# Patient Record
Sex: FEMALE | Race: BLACK OR AFRICAN AMERICAN | Employment: UNEMPLOYED | URBAN - METROPOLITAN AREA
[De-identification: names, ages, dates, MRNs, and addresses within clinical notes are randomized per-mention and may not be internally consistent; named-entity substitution may affect disease eponyms.]

---

## 2019-03-13 ENCOUNTER — TRANSFERRED RECORDS (OUTPATIENT)
Dept: MULTI SPECIALTY CLINIC | Facility: CLINIC | Age: 19
End: 2019-03-13

## 2019-03-13 LAB
ABO + RH BLD: NORMAL
ABO + RH BLD: NORMAL
BLD GP AB SCN SERPL QL: NEGATIVE
C TRACH DNA SPEC QL PROBE+SIG AMP: NOT DETECTED
HBV SURFACE AG SERPL QL IA: NONREACTIVE
HCT VFR BLD AUTO: 38.6 %
HCT VFR BLD AUTO: 38.6 %
HEMOGLOBIN: 13.2 G/DL (ref 11.7–15.7)
HEMOGLOBIN: 13.2 G/DL (ref 11.7–15.7)
HIV 1+2 AB+HIV1 P24 AG SERPL QL IA: NONREACTIVE
N GONORRHOEA DNA SPEC QL PROBE+SIG AMP: NOT DETECTED
PLATELET # BLD AUTO: 240 10^9/L
RUBELLA ABY IGG: NORMAL
RUBELLA ANTIBODY IGG QUANTITATIVE: 13 IU/ML
SPECIMEN DESCRIP: NORMAL
SPECIMEN DESCRIPTION: NORMAL
TREPONEMA ANTIBODIES: NONREACTIVE

## 2019-05-09 ENCOUNTER — PRENATAL OFFICE VISIT (OUTPATIENT)
Dept: MIDWIFE SERVICES | Facility: CLINIC | Age: 19
End: 2019-05-09
Payer: COMMERCIAL

## 2019-05-09 ENCOUNTER — PRENATAL OFFICE VISIT (OUTPATIENT)
Dept: NURSING | Facility: CLINIC | Age: 19
End: 2019-05-09
Payer: COMMERCIAL

## 2019-05-09 VITALS
SYSTOLIC BLOOD PRESSURE: 113 MMHG | DIASTOLIC BLOOD PRESSURE: 75 MMHG | BODY MASS INDEX: 24.4 KG/M2 | TEMPERATURE: 98 F | HEART RATE: 90 BPM | WEIGHT: 160.5 LBS

## 2019-05-09 VITALS
HEIGHT: 68 IN | SYSTOLIC BLOOD PRESSURE: 113 MMHG | DIASTOLIC BLOOD PRESSURE: 75 MMHG | WEIGHT: 160.5 LBS | TEMPERATURE: 98 F | BODY MASS INDEX: 24.32 KG/M2 | HEART RATE: 90 BPM

## 2019-05-09 DIAGNOSIS — Z34.00 SUPERVISION OF NORMAL FIRST PREGNANCY: Primary | ICD-10-CM

## 2019-05-09 DIAGNOSIS — Z34.02 ENCOUNTER FOR SUPERVISION OF NORMAL FIRST PREGNANCY, SECOND TRIMESTER: Primary | ICD-10-CM

## 2019-05-09 PROBLEM — Z34.01 PREGNANCY, FIRST, FIRST TRIMESTER: Status: ACTIVE | Noted: 2019-03-13

## 2019-05-09 PROBLEM — F12.90 MARIJUANA USE: Status: ACTIVE | Noted: 2017-10-26

## 2019-05-09 PROBLEM — E55.9 VITAMIN D DEFICIENCY: Status: ACTIVE | Noted: 2019-03-15

## 2019-05-09 PROBLEM — Z78.9 NONIMMUNE TO HEPATITIS B VIRUS: Status: ACTIVE | Noted: 2019-03-15

## 2019-05-09 PROCEDURE — 36415 COLL VENOUS BLD VENIPUNCTURE: CPT | Performed by: ADVANCED PRACTICE MIDWIFE

## 2019-05-09 PROCEDURE — 99207 ZZC FIRST OB VISIT: CPT | Performed by: ADVANCED PRACTICE MIDWIFE

## 2019-05-09 PROCEDURE — 99207 ZZC NO CHARGE NURSE ONLY: CPT

## 2019-05-09 PROCEDURE — 99000 SPECIMEN HANDLING OFFICE-LAB: CPT | Performed by: ADVANCED PRACTICE MIDWIFE

## 2019-05-09 PROCEDURE — 81511 FTL CGEN ABNOR FOUR ANAL: CPT | Mod: 90 | Performed by: ADVANCED PRACTICE MIDWIFE

## 2019-05-09 RX ORDER — ECHINACEA PURPUREA EXTRACT 125 MG
2 TABLET ORAL
COMMUNITY
Start: 2019-04-07 | End: 2019-07-08

## 2019-05-09 RX ORDER — SODIUM CHLORIDE 0.65 %
AEROSOL, SPRAY (ML) NASAL
COMMUNITY
Start: 2019-04-07 | End: 2019-08-15

## 2019-05-09 RX ORDER — PNV NO.118/IRON FUMARATE/FA 29 MG-1 MG
1 TABLET,CHEWABLE ORAL
COMMUNITY
Start: 2019-04-04 | End: 2020-01-14

## 2019-05-09 RX ORDER — ALBUTEROL SULFATE 90 UG/1
1-2 AEROSOL, METERED RESPIRATORY (INHALATION)
COMMUNITY
Start: 2019-02-28

## 2019-05-09 SDOH — HEALTH STABILITY: MENTAL HEALTH
STRESS IS WHEN SOMEONE FEELS TENSE, NERVOUS, ANXIOUS, OR CAN'T SLEEP AT NIGHT BECAUSE THEIR MIND IS TROUBLED. HOW STRESSED ARE YOU?: NOT AT ALL

## 2019-05-09 SDOH — HEALTH STABILITY: PHYSICAL HEALTH: ON AVERAGE, HOW MANY MINUTES DO YOU ENGAGE IN EXERCISE AT THIS LEVEL?: 30 MIN

## 2019-05-09 SDOH — HEALTH STABILITY: PHYSICAL HEALTH: ON AVERAGE, HOW MANY DAYS PER WEEK DO YOU ENGAGE IN MODERATE TO STRENUOUS EXERCISE (LIKE A BRISK WALK)?: 5 DAYS

## 2019-05-09 SDOH — ECONOMIC STABILITY: FOOD INSECURITY: WITHIN THE PAST 12 MONTHS, THE FOOD YOU BOUGHT JUST DIDN'T LAST AND YOU DIDN'T HAVE MONEY TO GET MORE.: NEVER TRUE

## 2019-05-09 SDOH — ECONOMIC STABILITY: TRANSPORTATION INSECURITY
IN THE PAST 12 MONTHS, HAS THE LACK OF TRANSPORTATION KEPT YOU FROM MEDICAL APPOINTMENTS OR FROM GETTING MEDICATIONS?: NO

## 2019-05-09 SDOH — SOCIAL STABILITY: SOCIAL INSECURITY
WITHIN THE LAST YEAR, HAVE TO BEEN RAPED OR FORCED TO HAVE ANY KIND OF SEXUAL ACTIVITY BY YOUR PARTNER OR EX-PARTNER?: NO

## 2019-05-09 SDOH — ECONOMIC STABILITY: TRANSPORTATION INSECURITY
IN THE PAST 12 MONTHS, HAS LACK OF TRANSPORTATION KEPT YOU FROM MEETINGS, WORK, OR FROM GETTING THINGS NEEDED FOR DAILY LIVING?: NO

## 2019-05-09 SDOH — SOCIAL STABILITY: SOCIAL INSECURITY: WITHIN THE LAST YEAR, HAVE YOU BEEN AFRAID OF YOUR PARTNER OR EX-PARTNER?: NO

## 2019-05-09 SDOH — SOCIAL STABILITY: SOCIAL INSECURITY
WITHIN THE LAST YEAR, HAVE YOU BEEN KICKED, HIT, SLAPPED, OR OTHERWISE PHYSICALLY HURT BY YOUR PARTNER OR EX-PARTNER?: NO

## 2019-05-09 SDOH — ECONOMIC STABILITY: FOOD INSECURITY: WITHIN THE PAST 12 MONTHS, YOU WORRIED THAT YOUR FOOD WOULD RUN OUT BEFORE YOU GOT MONEY TO BUY MORE.: NEVER TRUE

## 2019-05-09 SDOH — ECONOMIC STABILITY: INCOME INSECURITY: HOW HARD IS IT FOR YOU TO PAY FOR THE VERY BASICS LIKE FOOD, HOUSING, MEDICAL CARE, AND HEATING?: NOT HARD AT ALL

## 2019-05-09 SDOH — SOCIAL STABILITY: SOCIAL INSECURITY: WITHIN THE LAST YEAR, HAVE YOU BEEN HUMILIATED OR EMOTIONALLY ABUSED IN OTHER WAYS BY YOUR PARTNER OR EX-PARTNER?: NO

## 2019-05-09 ASSESSMENT — MIFFLIN-ST. JEOR: SCORE: 1551.52

## 2019-05-09 NOTE — PROGRESS NOTES
16w3d   Deann Lund is a 18 year old who presents to the clinic for an new ob visit.  She not a previous CNM patient. This is her first pregnancy. She had new OB labs at [place]. Has history of asthma, usually only needs albuterol inhaler. Has had some nausea with this pregnancy, but has been able to keep food down and has not had weight loss. Pregnancy dating by early U/S on 3/19/19; was 9w1d.   Estimated Date of Delivery: Oct 21, 2019 is calculated from No LMP recorded. Patient is pregnant.     She has not had bleeding since her LMP.   She has had mild nausea. Weigh loss has not occurred.   This was not a planned pregnancy.   FOB is involved,  [place]   OTHER CONCERNS: none    INFECTION HISTORY  HIV: no  Hepatitis B: no  Hepatitis C: no  Syphilis:  no  Tuberculosis: no   PPD- no  Herpes self: no  Herpes partner:  no  Chlamydia:  Yes-in the past  Gonorrhea:  no  HPV: no  BV:  Yes-a while ago  Trichomonis:  Yes-in the past  Chicken Pox:  Yes-vaccinated  ====================================================  GENETIC SCREENING  Genetic screening reviewed. High Risk? no  ====================================================  PERSONAL/SOCIAL HISTORY  Lives with her sister.  Employment: Part time.  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  HX OF ABUSE: no  =====================================================   REVIEW OF SYSTEMS  CONSTITUTIONAL: NEGATIVE for fever, chills  EYES: NEGATIVE for vision changes   RESP:NEGATIVE for significant cough or SOB, POSITIVE for SOB/dyspnea and NEGATIVE for cough-productive  CV: NEGATIVE for chest pain, palpitations   GI: POSITIVE for nausea and NEGATIVE for constipation, diarrhea and vomiting  : NEGATIVE for frequency, dysuria, or hematuria  MUSCULOSKELETAL: NEGATIVE for significant arthralgias or myalgia  NEURO: POSITIVE for dizziness/lightheadedness and NEGATIVE for behavior changes, paresthesias, weakness and visual change  PSYCHIATRIC:  NEGATIVE for changes in mood or affect  ====================================================    PHYSICAL EXAM:  There were no vitals taken for this visit.  BMI- There is no height or weight on file to calculate BMI.,     RECOMMENDED WEIGHT GAIN: 25-35 lbs.  PHQ9- Today's Depression Rating was No Value exists for the : HP#PHQ9  GENERAL:  Pleasant pregnant female, alert, well groomed.   SKIN:  Warm and dry, without lesions or rashes  HEAD: Symmetrical features.  EYES:  PERRLA,   NECK:  Thyroid without enlargement and nodules.  Lymph nodes not palpable.   LUNGS:  Clear to auscultation.  HEART:  RRR without murmur.  ABDOMEN: Soft without masses , tenderness or organomegaly.  No CVA tenderness. No scars noted.     MUSCULOSKELETAL:  Full range of motion  EXTREMITIES:  No edema. No significant varicosities.     =========================================  ASSESSMENT:  16w3d,   (Z34.02) Encounter for supervision of normal first pregnancy, second trimester  (primary encounter diagnosis)  Plan: US OB > 14 Weeks    PREGNANCY AT RISK? no  ==========================================  PLAN:  Instructed on use of triage nurse line and contacting the on call CNM after hours for an urgent need such as fever, vagina bleeding, bladder or vaginal infection, rupture of membranes,  or term labor.    Discussed the indications, uses for and false positives for quad screen, nuchal translucency and fetal survey ultrasound at 18-20 weeks gestation. Had Quad screen drawn today.  Instructed on best evidence for: weight gain for her BMI for pregnancy; healthy diet and foods to avoid; exercise and activity during pregnancy;avoiding exposure to toxoplasmosis; and maintenance of a generally healthy lifestyle.   Discussed the harms, benefits, side effects and alternative therapies for current prescribed and OTC medications.  Discussed marijuana cessation, and urine drug screening in labor.    Farzad Brown CNM

## 2019-05-09 NOTE — NURSING NOTE
"Chief Complaint   Patient presents with     Prenatal Care     16+3       Initial There were no vitals taken for this visit. Estimated body mass index is 24.4 kg/m  as calculated from the following:    Height as of an earlier encounter on 19: 1.727 m (5' 8\").    Weight as of an earlier encounter on 19: 72.8 kg (160 lb 8 oz).  BP completed using cuff size: regular    Questioned patient about current smoking habits.  Pt.           The following HM Due: NONE      The following patient reported/Care Every where data was sent to:  P ABSTRACT QUALITY INITIATIVES [[id number]]        patient has appointment for today  Kayleigh Diane                "

## 2019-05-09 NOTE — PROGRESS NOTES
"Important Information for Provider: ***    Prenatal OB Questionnaire  {DELETE after use: reminder .peqobprenatal if pulling in the flowsheet for prenatal questionnaire responses}    Allergies as of 5/9/2019:    Allergies as of 05/09/2019 - Reviewed 05/09/2019   Allergen Reaction Noted     Pineapple Swelling 07/20/2015       Current medications are:  Current Outpatient Medications   Medication Sig Dispense Refill     albuterol (PROVENTIL HFA) 108 (90 Base) MCG/ACT inhaler Inhale 1-2 puffs into the lungs       Prenatal Vit-Fe Fumarate-FA (PRENATAL 19) CHEW Take 1 tablet by mouth       sodium chloride (SALINE MIST) 0.65 % nasal spray Spray 2 sprays in nostril       DEEP SEA NASAL SPRAY 0.65 % nasal spray            Early ultrasound screening tool:    Does patient have irregular periods?  { Yes/No (No Default) :743227::\"No\"}  Did patient use hormonal birth control in the three months prior to positive urine pregnancy test? { Yes/No (No Default) :503999::\"No\"}  Is the patient breastfeeding?  { Yes/No (No Default) :887470::\"No\"}  Is the patient 10 weeks or greater at time of education visit?  { Yes/No (No Default) :169083::\"No\"}    {If yes to any of the questions listed above, order an OB Ultrasound for dating.  Patient must be at least 6 weeks to perform dating ultrasound.}    "

## 2019-05-09 NOTE — PROGRESS NOTES
Patient presents for new ob transfer from [facility]. Labs and ultrasound were done there. Quad screening done today . Has NOB with CNM today  ELP drawn 3/13/19 normal          Patient supplied answers from flow sheet for:  Prenatal OB Questionnaire.  Past Medical History  Diabetes?: No  Hypertension : No  Heart disease, mitral valve prolapse or rheumatic fever?: No  An autoimmune disease such as lupus or rheumatoid arthritis?: No  Kidney disease or urinary tract infection?: No  Epilepsy, seizures or spells?: No  Migraine headaches?: No  A stroke or loss of function or sensation?: No  Any other neurological problems?: No  Have you ever been treated for depression?: No  Are you having problems with crying spells or loss of self-esteem?: No  Have you ever required psychiatric care?: No  Have you ever had hepatitis, liver disease or jaundice?: No  Have you been treated for blood clots in your veins, deep vein thromosis, inflammation in the veins, thrombosis, phlebitis, pulmonary embolism or varicosities?: No  Have you had excessive bleeding after surgery or dental work?: No  Do you bleed more than other women after a cut or scratch?: No  Do you have a history of anemia?: No  Have you ever had thyroid problems or taken thyroid medication?: No   Do you have any endocrine problems?: No  Have you ever been in a major accident or suffered serious trauma?: No  Within the last year, has anyone hit, slapped, kicked or otherwise hurt you?: No  In the last year, has anyone forced you to have sex when you didn't want to?: No    Past Medical History 2   Have you ever received a blood transfusion?: No  Would you refuse a blood transfusion if a doctor judged it to be medically necessary?: No   If you answered Yes, would you rather die than receive a blood transfusion?: No  If you answered Yes, is this for Sabianist reasons?: No  Does anyone in your home smoke?: No  Do you use tobacco products?: No  Do you drink beer, wine or hard  liquor?: No  Do you use any of the following: marijuana, speed, cocaine, heroin, hallucinogens or other drugs?: No   Is your blood type Rh negative?: No  Have you ever had abnormal antibodies in your blood?: No  Have you ever had asthma?: (!) Yes  Have you ever had tuberculosis?: No  Do you have any allergies to drugs or over-the-counter medications?: No  Allergies: Dust Mites, Aspartame, Ethanol, Venlafaxine, Hydrochloride, Sertraline: (!) Yes  Have you had any breast problems?: No  Have you ever ?: No  Have you had any gynecological surgical procedures such as cervical conization, a LEEP procedure, laser treatment, cryosurgery of the cervix or a dilation and curettage, etc?: No  Have you ever had any other surgical procedures?: No  Have you been hospitalized for a nonsurgical reason excluding normal delivery?: No  Have you ever had any anesthetic complications?: No  Have you ever had an abnormal pap smear?: No    Past Medical History (Continued)  Do you have a history of abnormalities of the uterus?: No  Did your mother take MARTITA or any other hormones when she was pregnant with you?: No  Did it take you more than a year to become pregnant?: No  Have you ever been evaluated or treated for infertility?: No  Is there a history of medical problems in your family, which you feel may be important to this pregnancy?: No  Do you have any other problems we have not asked about which you feel may be important to this pregnancy?: No    Symptoms since last menstrual period  Do you have any of the following symptoms: abdominal pain, blood in stools or urine, chest pain, shortness of breath, coughing or vomiting up blood, your heart racing or skipping beats, nausea and vomiting, pain on urination or vaginal discharge or bleed: (!) Yes  Will the patient be 35 years old or older at the time of delivery?: No    Has the patient, baby's father or anyone in either family had:  Thalassemia (Italian, Greek, Mediterranean or   background only) and an MCV result less than 80?: No  Neural tube defect such as meningomyelocele, spina bifida or anencephaly?: No  Congenital heart defect?: No  Down's Syndrome?: No  Raj-Sachs disease (Presybeterian, Cajun, Maltese-Hertford)?: No  Sickle cell disease or trait ()?: No  Hemophilia or other inherited problems of blood?: No  Muscular dystrophy?: No  Cystic fibrosis?: No  Vinton's chorea?: No  Mental retardation/autism?: No  If yes, was the person tested for fragile X?: No  Any other inherited genetic or chromosomal disorder?: No  Maternal metabolic disorder (e.g Insulin-dependent diabetes, PKU)?: No  A child with birth defects not listed above?: No  Recurrent pregnancy loss or stillbirth?: No   Has the patient had any medications/street drugs/alcohol since her last menstrual period?: No  Does the patient or baby's father have any other genetic risks?: No    Infection History   Do you object to being tested for Hepatitis B?: No  Do you object to being tested for HIV?: No   Do you feel that you are at high risk for coming in contact with the AIDS virus?: No  Have you ever been treated for tuberculosis?: No  Have you ever had a positive skin test for tuberculosis?: No  Do you live with someone who has tuberculosis?: No  Have you ever been exposed to tuberculosis?: No  Do you have genital herpes?: No  Does your partner have genital herpes?: No  Have you had a viral illness since your last period?: No  Have you ever had gonorrhea, chlamydia, syphilis, venereal warts, trichomoniasis, pelvic inflammatory disease or any other sexually transmitted disease?: (!) Yes  Do you know if you are a genital group B streptococcus carrier?: No  Have you had chicken pox/varicella?: No   Have you been vaccinated against chicken Pox?: No  Have you had any other infectious diseases?: No

## 2019-05-13 LAB
# FETUSES US: NORMAL
# FETUSES: NORMAL
AFP ADJ MOM AMN: 0.99
AFP SERPL-MCNC: 36 NG/ML
AGE - REPORTED: 20 YR
CURRENT SMOKER: NO
CURRENT SMOKER: NO
DIABETES STATUS PATIENT: NO
FAMILY MEMBER DISEASES HX: NO
FAMILY MEMBER DISEASES HX: NO
GA METHOD: NORMAL
GA METHOD: NORMAL
GA: NORMAL WK
HCG MOM SERPL: 1.89
HCG SERPL-ACNC: NORMAL IU/L
HX OF HEREDITARY DISORDERS: NO
IDDM PATIENT QL: NO
INHIBIN A MOM SERPL: 0.98
INHIBIN A SERPL-MCNC: 145 PG/ML
INTEGRATED SCN PATIENT-IMP: NORMAL
IVF PREGNANCY: NO
LMP START DATE: NO
MONOCHORIONIC TWINS: NO
PATHOLOGY STUDY: NORMAL
PREV FETUS DEFECT: NO
SERVICE CMNT-IMP: NO
SPECIMEN DRAWN SERPL: NORMAL
U ESTRIOL MOM SERPL: 0.89
U ESTRIOL SERPL-MCNC: 0.87 NG/ML
VALPROIC/CARBAMAZEPINE STATUS: NO
WEIGHT UNITS: NORMAL

## 2019-06-05 ENCOUNTER — HOSPITAL ENCOUNTER (OUTPATIENT)
Dept: ULTRASOUND IMAGING | Facility: CLINIC | Age: 19
Discharge: HOME | End: 2019-06-05
Attending: ADVANCED PRACTICE MIDWIFE | Admitting: ADVANCED PRACTICE MIDWIFE
Payer: COMMERCIAL

## 2019-06-05 DIAGNOSIS — Z34.02 ENCOUNTER FOR SUPERVISION OF NORMAL FIRST PREGNANCY, SECOND TRIMESTER: ICD-10-CM

## 2019-06-05 PROCEDURE — 76805 OB US >/= 14 WKS SNGL FETUS: CPT

## 2019-06-07 ENCOUNTER — PRENATAL OFFICE VISIT (OUTPATIENT)
Dept: MIDWIFE SERVICES | Facility: CLINIC | Age: 19
End: 2019-06-07
Payer: COMMERCIAL

## 2019-06-07 VITALS
SYSTOLIC BLOOD PRESSURE: 123 MMHG | HEIGHT: 68 IN | DIASTOLIC BLOOD PRESSURE: 73 MMHG | BODY MASS INDEX: 25.76 KG/M2 | WEIGHT: 170 LBS | HEART RATE: 94 BPM | OXYGEN SATURATION: 100 %

## 2019-06-07 DIAGNOSIS — Z34.02 ENCOUNTER FOR SUPERVISION OF NORMAL FIRST PREGNANCY, SECOND TRIMESTER: Primary | ICD-10-CM

## 2019-06-07 PROCEDURE — 99207 ZZC PRENATAL VISIT: CPT | Performed by: ADVANCED PRACTICE MIDWIFE

## 2019-06-07 ASSESSMENT — PATIENT HEALTH QUESTIONNAIRE - PHQ9: SUM OF ALL RESPONSES TO PHQ QUESTIONS 1-9: 1

## 2019-06-07 ASSESSMENT — MIFFLIN-ST. JEOR: SCORE: 1594.61

## 2019-06-07 NOTE — PROGRESS NOTES
Reviewed US and all anatomy was WNL.  Works at [employer] so busy shifts.  On her feet daily.  Discussed wt gain which is alittle advanced at 16#.  Cut down on caloric load with some low jhoan food choices like veggies.  Watch juice intake.  ASSESSMENT: 20w4d    PLAN: RTC in 4 wks for GCT testing.   PJ

## 2019-07-08 ENCOUNTER — PRENATAL OFFICE VISIT (OUTPATIENT)
Dept: MIDWIFE SERVICES | Facility: CLINIC | Age: 19
End: 2019-07-08
Payer: COMMERCIAL

## 2019-07-08 VITALS
BODY MASS INDEX: 26.66 KG/M2 | OXYGEN SATURATION: 100 % | SYSTOLIC BLOOD PRESSURE: 120 MMHG | HEART RATE: 90 BPM | HEIGHT: 68 IN | WEIGHT: 175.9 LBS | DIASTOLIC BLOOD PRESSURE: 73 MMHG

## 2019-07-08 DIAGNOSIS — Z34.02 ENCOUNTER FOR SUPERVISION OF NORMAL FIRST PREGNANCY, SECOND TRIMESTER: ICD-10-CM

## 2019-07-08 LAB
GLUCOSE 1H P 50 G GLC PO SERPL-MCNC: 73 MG/DL (ref 60–129)
HGB BLD-MCNC: 10.5 G/DL (ref 11.7–15.7)

## 2019-07-08 PROCEDURE — 36415 COLL VENOUS BLD VENIPUNCTURE: CPT | Performed by: ADVANCED PRACTICE MIDWIFE

## 2019-07-08 PROCEDURE — 99207 ZZC PRENATAL VISIT: CPT | Performed by: ADVANCED PRACTICE MIDWIFE

## 2019-07-08 PROCEDURE — 00000218 ZZHCL STATISTIC OBHBG - HEMOGLOBIN: Performed by: ADVANCED PRACTICE MIDWIFE

## 2019-07-08 PROCEDURE — 82950 GLUCOSE TEST: CPT | Performed by: ADVANCED PRACTICE MIDWIFE

## 2019-07-08 ASSESSMENT — ANXIETY QUESTIONNAIRES
2. NOT BEING ABLE TO STOP OR CONTROL WORRYING: NOT AT ALL
3. WORRYING TOO MUCH ABOUT DIFFERENT THINGS: NOT AT ALL
6. BECOMING EASILY ANNOYED OR IRRITABLE: NOT AT ALL
1. FEELING NERVOUS, ANXIOUS, OR ON EDGE: NOT AT ALL
GAD7 TOTAL SCORE: 0
5. BEING SO RESTLESS THAT IT IS HARD TO SIT STILL: NOT AT ALL
7. FEELING AFRAID AS IF SOMETHING AWFUL MIGHT HAPPEN: NOT AT ALL
IF YOU CHECKED OFF ANY PROBLEMS ON THIS QUESTIONNAIRE, HOW DIFFICULT HAVE THESE PROBLEMS MADE IT FOR YOU TO DO YOUR WORK, TAKE CARE OF THINGS AT HOME, OR GET ALONG WITH OTHER PEOPLE: NOT DIFFICULT AT ALL

## 2019-07-08 ASSESSMENT — MIFFLIN-ST. JEOR: SCORE: 1621.38

## 2019-07-08 ASSESSMENT — PATIENT HEALTH QUESTIONNAIRE - PHQ9
5. POOR APPETITE OR OVEREATING: NOT AT ALL
SUM OF ALL RESPONSES TO PHQ QUESTIONS 1-9: 0

## 2019-07-08 NOTE — PROGRESS NOTES
Doing well.  Having a girl and very excited.  Wants to see her again so when is next US.  Reviewed no other US expected if 1st US is nl for all fetal anatomy that can see verified as normal.  Positive fetal movement.  Working on cessation of marijuana use by tapering.   Doing the GCT today.  ASSESSMENT: 25w0d   PLAN: RTC in 4 wks.    PJ

## 2019-07-09 ASSESSMENT — ANXIETY QUESTIONNAIRES: GAD7 TOTAL SCORE: 0

## 2019-08-15 ENCOUNTER — PRENATAL OFFICE VISIT (OUTPATIENT)
Dept: MIDWIFE SERVICES | Facility: CLINIC | Age: 19
End: 2019-08-15
Payer: COMMERCIAL

## 2019-08-15 VITALS
WEIGHT: 179.5 LBS | BODY MASS INDEX: 27.2 KG/M2 | HEART RATE: 101 BPM | OXYGEN SATURATION: 100 % | HEIGHT: 68 IN | SYSTOLIC BLOOD PRESSURE: 131 MMHG | DIASTOLIC BLOOD PRESSURE: 73 MMHG

## 2019-08-15 DIAGNOSIS — Z34.92 PRENATAL CARE IN SECOND TRIMESTER: Primary | ICD-10-CM

## 2019-08-15 PROCEDURE — 99207 ZZC PRENATAL VISIT: CPT | Performed by: ADVANCED PRACTICE MIDWIFE

## 2019-08-15 ASSESSMENT — MIFFLIN-ST. JEOR: SCORE: 1637.71

## 2019-08-15 NOTE — PROGRESS NOTES
"Chief Complaint   Patient presents with     Prenatal Care     30+3.       Initial /73 (BP Location: Left arm, Patient Position: Sitting, Cuff Size: Adult Regular)   Pulse 101   Ht 1.727 m (5' 8\")   Wt 81.4 kg (179 lb 8 oz)   SpO2 100%   BMI 27.29 kg/m   Estimated body mass index is 27.29 kg/m  as calculated from the following:    Height as of this encounter: 1.727 m (5' 8\").    Weight as of this encounter: 81.4 kg (179 lb 8 oz).  BP completed using cuff size: regular    Questioned patient about current smoking habits.  Pt. quit smoking some time ago.          The following HM Due: NONE      The following patient reported/Care Every where data was sent to:  P ABSTRACT QUALITY INITIATIVES [[id number]]  Chlamydia testing done on this date: 3/13/2019      n/a and patient has appointment for today              "

## 2019-08-15 NOTE — PROGRESS NOTES
Doing ok but really wonders all the time what her baby is doing.  Active baby.  Bedside US and is cephalic but not in pelvis.  Passed GCT.  Has shower in 9 days.  ASSESSMENT: 30w3d   PLAN: RTC in 2 wks for PNC.     PJ

## 2019-08-27 ENCOUNTER — PRENATAL OFFICE VISIT (OUTPATIENT)
Dept: MIDWIFE SERVICES | Facility: CLINIC | Age: 19
End: 2019-08-27
Payer: COMMERCIAL

## 2019-08-27 VITALS
TEMPERATURE: 97.2 F | DIASTOLIC BLOOD PRESSURE: 84 MMHG | BODY MASS INDEX: 27.22 KG/M2 | SYSTOLIC BLOOD PRESSURE: 137 MMHG | WEIGHT: 179 LBS | HEART RATE: 98 BPM

## 2019-08-27 DIAGNOSIS — N89.8 VAGINAL DISCHARGE: ICD-10-CM

## 2019-08-27 DIAGNOSIS — N76.0 BV (BACTERIAL VAGINOSIS): ICD-10-CM

## 2019-08-27 DIAGNOSIS — Z34.92 PRENATAL CARE IN SECOND TRIMESTER: Primary | ICD-10-CM

## 2019-08-27 DIAGNOSIS — B96.89 BV (BACTERIAL VAGINOSIS): ICD-10-CM

## 2019-08-27 LAB
SPECIMEN SOURCE: ABNORMAL
WET PREP SPEC: ABNORMAL

## 2019-08-27 PROCEDURE — 99212 OFFICE O/P EST SF 10 MIN: CPT | Performed by: ADVANCED PRACTICE MIDWIFE

## 2019-08-27 PROCEDURE — 87210 SMEAR WET MOUNT SALINE/INK: CPT | Performed by: ADVANCED PRACTICE MIDWIFE

## 2019-08-27 RX ORDER — METRONIDAZOLE 500 MG/1
500 TABLET ORAL 2 TIMES DAILY
Qty: 14 TABLET | Refills: 0 | Status: SHIPPED | OUTPATIENT
Start: 2019-08-27 | End: 2019-09-27

## 2019-08-27 NOTE — PROGRESS NOTES
Had shower and is doing well.  Some conflict between her and FOB as he is not coming to her appt with her.   Had been together for 2+ years before pregnancy.  He's not coping so well with being mature.  Discussed her cessation of THC use and has not used in weeks.  Is proud of herself for this.  Will get confirmation DAY7 next visit.  Vaginal discharge   ASSESSMENT: 32w1d   Discharge.    PLAN: RTC in 2 wks  Wet prep.   PJ

## 2019-09-27 ENCOUNTER — PRENATAL OFFICE VISIT (OUTPATIENT)
Dept: MIDWIFE SERVICES | Facility: CLINIC | Age: 19
End: 2019-09-27
Payer: COMMERCIAL

## 2019-09-27 VITALS
OXYGEN SATURATION: 97 % | BODY MASS INDEX: 28.51 KG/M2 | HEART RATE: 90 BPM | WEIGHT: 188.1 LBS | SYSTOLIC BLOOD PRESSURE: 136 MMHG | HEIGHT: 68 IN | DIASTOLIC BLOOD PRESSURE: 70 MMHG

## 2019-09-27 DIAGNOSIS — B37.2 YEAST INFECTION OF THE SKIN: ICD-10-CM

## 2019-09-27 DIAGNOSIS — Z11.3 SCREENING EXAMINATION FOR VENEREAL DISEASE: ICD-10-CM

## 2019-09-27 DIAGNOSIS — Z34.93 PRENATAL CARE IN THIRD TRIMESTER: Primary | ICD-10-CM

## 2019-09-27 LAB — HGB BLD-MCNC: 10.6 G/DL (ref 11.7–15.7)

## 2019-09-27 PROCEDURE — 86803 HEPATITIS C AB TEST: CPT | Performed by: ADVANCED PRACTICE MIDWIFE

## 2019-09-27 PROCEDURE — 00000218 ZZHCL STATISTIC OBHBG - HEMOGLOBIN: Performed by: ADVANCED PRACTICE MIDWIFE

## 2019-09-27 PROCEDURE — 87653 STREP B DNA AMP PROBE: CPT | Performed by: ADVANCED PRACTICE MIDWIFE

## 2019-09-27 PROCEDURE — 86780 TREPONEMA PALLIDUM: CPT | Performed by: ADVANCED PRACTICE MIDWIFE

## 2019-09-27 PROCEDURE — 87591 N.GONORRHOEAE DNA AMP PROB: CPT | Performed by: ADVANCED PRACTICE MIDWIFE

## 2019-09-27 PROCEDURE — 87491 CHLMYD TRACH DNA AMP PROBE: CPT | Performed by: ADVANCED PRACTICE MIDWIFE

## 2019-09-27 PROCEDURE — 99207 ZZC PRENATAL VISIT: CPT | Performed by: ADVANCED PRACTICE MIDWIFE

## 2019-09-27 PROCEDURE — 36415 COLL VENOUS BLD VENIPUNCTURE: CPT | Performed by: ADVANCED PRACTICE MIDWIFE

## 2019-09-27 RX ORDER — FLUCONAZOLE 200 MG/1
200 TABLET ORAL DAILY
Qty: 2 TABLET | Refills: 0 | Status: SHIPPED | OUTPATIENT
Start: 2019-09-27 | End: 2019-10-04

## 2019-09-27 ASSESSMENT — MIFFLIN-ST. JEOR: SCORE: 1676.72

## 2019-09-27 NOTE — PROGRESS NOTES
"Deann here for PNV.  No concerns except \"what if they got the sex wrong.\"  She is to have 36 week labs today.  Is having some BH ctx based on her description.  Used marijuana in response to a fight with the FOB a few weeks ago after abstaining so discussed again about not using in pregnancy.  Will do DAU7 in 2 wks and at delivery.  Active baby.    ASSESSMENT: 36w4d    PLAN: RTC weekly.    PJ  "

## 2019-09-28 LAB
GP B STREP DNA SPEC QL NAA+PROBE: NEGATIVE
SPECIMEN SOURCE: NORMAL
T PALLIDUM AB SER QL: NONREACTIVE

## 2019-09-29 LAB
C TRACH DNA SPEC QL NAA+PROBE: NEGATIVE
N GONORRHOEA DNA SPEC QL NAA+PROBE: NEGATIVE
SPECIMEN SOURCE: NORMAL
SPECIMEN SOURCE: NORMAL

## 2019-09-30 LAB — HCV AB SERPL QL IA: NONREACTIVE

## 2019-10-04 ENCOUNTER — PRENATAL OFFICE VISIT (OUTPATIENT)
Dept: MIDWIFE SERVICES | Facility: CLINIC | Age: 19
End: 2019-10-04
Payer: COMMERCIAL

## 2019-10-04 VITALS
DIASTOLIC BLOOD PRESSURE: 74 MMHG | HEIGHT: 68 IN | BODY MASS INDEX: 29.07 KG/M2 | HEART RATE: 98 BPM | OXYGEN SATURATION: 100 % | WEIGHT: 191.8 LBS | SYSTOLIC BLOOD PRESSURE: 136 MMHG

## 2019-10-04 DIAGNOSIS — Z34.93 PRENATAL CARE IN THIRD TRIMESTER: Primary | ICD-10-CM

## 2019-10-04 PROCEDURE — 99207 ZZC PRENATAL VISIT: CPT | Performed by: ADVANCED PRACTICE MIDWIFE

## 2019-10-04 ASSESSMENT — MIFFLIN-ST. JEOR: SCORE: 1693.5

## 2019-10-04 NOTE — PROGRESS NOTES
138/78 repeat BP.   Having more BH ctx that are stronger.  Discussed labor and to call when uncomfortable x hours or when she can not tolerate them at home anymore.  Wants to stay home for as long as possible and have water birth.   Discussed fetal weight estimate by [facility] vs US unless CNM concerned.  Nl size for this pt with EFW 6# 10 oz today.  Active baby.    No indication for pelvic as no regular ctx yet.  People are telling her to find out.  Reviewed.   ASSESSMENT: 37w4d     PLAN: RTC weekly until delivery.   THC screen next week.   Monitor BP.  JE

## 2019-10-10 ENCOUNTER — PRENATAL OFFICE VISIT (OUTPATIENT)
Dept: MIDWIFE SERVICES | Facility: CLINIC | Age: 19
End: 2019-10-10
Payer: COMMERCIAL

## 2019-10-10 VITALS
HEART RATE: 99 BPM | WEIGHT: 188 LBS | BODY MASS INDEX: 28.59 KG/M2 | DIASTOLIC BLOOD PRESSURE: 75 MMHG | SYSTOLIC BLOOD PRESSURE: 125 MMHG | OXYGEN SATURATION: 100 %

## 2019-10-10 DIAGNOSIS — Z34.93 PRENATAL CARE IN THIRD TRIMESTER: Primary | ICD-10-CM

## 2019-10-10 PROCEDURE — 59426 ANTEPARTUM CARE ONLY: CPT | Performed by: ADVANCED PRACTICE MIDWIFE

## 2019-10-10 PROCEDURE — 99207 ZZC PRENATAL VISIT: CPT | Performed by: ADVANCED PRACTICE MIDWIFE

## 2019-10-10 NOTE — PROGRESS NOTES
"Deann is here for PNC.  Is still having BH ctx but has no pattern or consistency of the ctx.  Active baby.  BP is nl.  Is having more issues with eating due to reflux and feeling full regardless of what she eats, \"cookies or good food\".    Head is deeper in pelvis today than last week.  No other concerns.  ASSESSMENT: 38w3d   PLAN: RTC weekly until delivery.   PJ  "

## 2019-10-14 ENCOUNTER — TELEPHONE (OUTPATIENT)
Dept: MIDWIFE SERVICES | Facility: CLINIC | Age: 19
End: 2019-10-14

## 2019-10-14 ENCOUNTER — HOSPITAL ENCOUNTER (INPATIENT)
Facility: CLINIC | Age: 19
LOS: 3 days | Discharge: HOME HEALTH | End: 2019-10-17
Attending: ADVANCED PRACTICE MIDWIFE | Admitting: OBSTETRICS & GYNECOLOGY
Payer: COMMERCIAL

## 2019-10-14 DIAGNOSIS — Z98.891 S/P C-SECTION: Primary | ICD-10-CM

## 2019-10-14 LAB
ABO + RH BLD: NORMAL
ABO + RH BLD: NORMAL
AMPHETAMINES UR QL SCN: NEGATIVE
BASOPHILS # BLD AUTO: 0 10E9/L (ref 0–0.2)
BASOPHILS NFR BLD AUTO: 0.3 %
BLD GP AB SCN SERPL QL: NORMAL
BLOOD BANK CMNT PATIENT-IMP: NORMAL
CANNABINOIDS UR QL: NEGATIVE
COCAINE UR QL: NEGATIVE
DIFFERENTIAL METHOD BLD: NORMAL
EOSINOPHIL # BLD AUTO: 0.1 10E9/L (ref 0–0.7)
EOSINOPHIL NFR BLD AUTO: 1.4 %
ERYTHROCYTE [DISTWIDTH] IN BLOOD BY AUTOMATED COUNT: 12.5 % (ref 10–15)
HCT VFR BLD AUTO: 36.6 % (ref 35–47)
HGB BLD-MCNC: 12.5 G/DL (ref 11.7–15.7)
IMM GRANULOCYTES # BLD: 0 10E9/L (ref 0–0.4)
IMM GRANULOCYTES NFR BLD: 0.4 %
LYMPHOCYTES # BLD AUTO: 1.2 10E9/L (ref 0.8–5.3)
LYMPHOCYTES NFR BLD AUTO: 15.6 %
MCH RBC QN AUTO: 31.8 PG (ref 26.5–33)
MCHC RBC AUTO-ENTMCNC: 34.2 G/DL (ref 31.5–36.5)
MCV RBC AUTO: 93 FL (ref 78–100)
MONOCYTES # BLD AUTO: 0.8 10E9/L (ref 0–1.3)
MONOCYTES NFR BLD AUTO: 10.1 %
NEUTROPHILS # BLD AUTO: 5.7 10E9/L (ref 1.6–8.3)
NEUTROPHILS NFR BLD AUTO: 72.2 %
NRBC # BLD AUTO: 0 10*3/UL
NRBC BLD AUTO-RTO: 0 /100
OPIATES UR QL SCN: NEGATIVE
PCP UR QL SCN: NEGATIVE
PLATELET # BLD AUTO: 177 10E9/L (ref 150–450)
RBC # BLD AUTO: 3.93 10E12/L (ref 3.8–5.2)
RUPTURE OF FETAL MEMBRANES BY ROM PLUS: POSITIVE
SPECIMEN EXP DATE BLD: NORMAL
WBC # BLD AUTO: 7.8 10E9/L (ref 4–11)

## 2019-10-14 PROCEDURE — 36415 COLL VENOUS BLD VENIPUNCTURE: CPT | Performed by: ADVANCED PRACTICE MIDWIFE

## 2019-10-14 PROCEDURE — 25800030 ZZH RX IP 258 OP 636: Performed by: ADVANCED PRACTICE MIDWIFE

## 2019-10-14 PROCEDURE — 84112 EVAL AMNIOTIC FLUID PROTEIN: CPT | Performed by: ADVANCED PRACTICE MIDWIFE

## 2019-10-14 PROCEDURE — 85025 COMPLETE CBC W/AUTO DIFF WBC: CPT | Performed by: ADVANCED PRACTICE MIDWIFE

## 2019-10-14 PROCEDURE — 86780 TREPONEMA PALLIDUM: CPT | Performed by: ADVANCED PRACTICE MIDWIFE

## 2019-10-14 PROCEDURE — 25000125 ZZHC RX 250: Performed by: ADVANCED PRACTICE MIDWIFE

## 2019-10-14 PROCEDURE — 86900 BLOOD TYPING SEROLOGIC ABO: CPT | Performed by: ADVANCED PRACTICE MIDWIFE

## 2019-10-14 PROCEDURE — 86850 RBC ANTIBODY SCREEN: CPT | Performed by: ADVANCED PRACTICE MIDWIFE

## 2019-10-14 PROCEDURE — 86901 BLOOD TYPING SEROLOGIC RH(D): CPT | Performed by: ADVANCED PRACTICE MIDWIFE

## 2019-10-14 PROCEDURE — 12000001 ZZH R&B MED SURG/OB UMMC

## 2019-10-14 PROCEDURE — G0463 HOSPITAL OUTPT CLINIC VISIT: HCPCS

## 2019-10-14 PROCEDURE — 25800030 ZZH RX IP 258 OP 636

## 2019-10-14 PROCEDURE — 80307 DRUG TEST PRSMV CHEM ANLYZR: CPT | Performed by: ADVANCED PRACTICE MIDWIFE

## 2019-10-14 RX ORDER — OXYCODONE AND ACETAMINOPHEN 5; 325 MG/1; MG/1
1 TABLET ORAL
Status: DISCONTINUED | OUTPATIENT
Start: 2019-10-14 | End: 2019-10-15

## 2019-10-14 RX ORDER — NALOXONE HYDROCHLORIDE 0.4 MG/ML
.1-.4 INJECTION, SOLUTION INTRAMUSCULAR; INTRAVENOUS; SUBCUTANEOUS
Status: DISCONTINUED | OUTPATIENT
Start: 2019-10-14 | End: 2019-10-15

## 2019-10-14 RX ORDER — OXYTOCIN/0.9 % SODIUM CHLORIDE 30/500 ML
1-24 PLASTIC BAG, INJECTION (ML) INTRAVENOUS CONTINUOUS
Status: DISCONTINUED | OUTPATIENT
Start: 2019-10-14 | End: 2019-10-15

## 2019-10-14 RX ORDER — OXYTOCIN/0.9 % SODIUM CHLORIDE 30/500 ML
100-340 PLASTIC BAG, INJECTION (ML) INTRAVENOUS CONTINUOUS PRN
Status: DISCONTINUED | OUTPATIENT
Start: 2019-10-14 | End: 2019-10-15

## 2019-10-14 RX ORDER — SODIUM CHLORIDE, SODIUM LACTATE, POTASSIUM CHLORIDE, CALCIUM CHLORIDE 600; 310; 30; 20 MG/100ML; MG/100ML; MG/100ML; MG/100ML
INJECTION, SOLUTION INTRAVENOUS CONTINUOUS
Status: DISCONTINUED | OUTPATIENT
Start: 2019-10-14 | End: 2019-10-15

## 2019-10-14 RX ORDER — SODIUM CHLORIDE, SODIUM LACTATE, POTASSIUM CHLORIDE, CALCIUM CHLORIDE 600; 310; 30; 20 MG/100ML; MG/100ML; MG/100ML; MG/100ML
INJECTION, SOLUTION INTRAVENOUS
Status: DISCONTINUED
Start: 2019-10-14 | End: 2019-10-14 | Stop reason: HOSPADM

## 2019-10-14 RX ORDER — CARBOPROST TROMETHAMINE 250 UG/ML
250 INJECTION, SOLUTION INTRAMUSCULAR
Status: DISCONTINUED | OUTPATIENT
Start: 2019-10-14 | End: 2019-10-15

## 2019-10-14 RX ORDER — ONDANSETRON 2 MG/ML
4 INJECTION INTRAMUSCULAR; INTRAVENOUS EVERY 6 HOURS PRN
Status: DISCONTINUED | OUTPATIENT
Start: 2019-10-14 | End: 2019-10-15

## 2019-10-14 RX ORDER — METHYLERGONOVINE MALEATE 0.2 MG/ML
200 INJECTION INTRAVENOUS
Status: DISCONTINUED | OUTPATIENT
Start: 2019-10-14 | End: 2019-10-15

## 2019-10-14 RX ORDER — LIDOCAINE 40 MG/G
CREAM TOPICAL
Status: DISCONTINUED | OUTPATIENT
Start: 2019-10-14 | End: 2019-10-15

## 2019-10-14 RX ORDER — ONDANSETRON 2 MG/ML
4 INJECTION INTRAMUSCULAR; INTRAVENOUS EVERY 6 HOURS PRN
Status: DISCONTINUED | OUTPATIENT
Start: 2019-10-14 | End: 2019-10-14

## 2019-10-14 RX ORDER — ACETAMINOPHEN 325 MG/1
650 TABLET ORAL EVERY 4 HOURS PRN
Status: DISCONTINUED | OUTPATIENT
Start: 2019-10-14 | End: 2019-10-15

## 2019-10-14 RX ORDER — FENTANYL CITRATE 50 UG/ML
50-100 INJECTION, SOLUTION INTRAMUSCULAR; INTRAVENOUS
Status: DISCONTINUED | OUTPATIENT
Start: 2019-10-14 | End: 2019-10-15

## 2019-10-14 RX ORDER — IBUPROFEN 800 MG/1
800 TABLET, FILM COATED ORAL
Status: DISCONTINUED | OUTPATIENT
Start: 2019-10-14 | End: 2019-10-15

## 2019-10-14 RX ORDER — OXYTOCIN 10 [USP'U]/ML
10 INJECTION, SOLUTION INTRAMUSCULAR; INTRAVENOUS
Status: DISCONTINUED | OUTPATIENT
Start: 2019-10-14 | End: 2019-10-15

## 2019-10-14 RX ADMIN — SODIUM CHLORIDE, POTASSIUM CHLORIDE, SODIUM LACTATE AND CALCIUM CHLORIDE: 600; 310; 30; 20 INJECTION, SOLUTION INTRAVENOUS at 23:34

## 2019-10-14 RX ADMIN — SODIUM CHLORIDE, POTASSIUM CHLORIDE, SODIUM LACTATE AND CALCIUM CHLORIDE: 600; 310; 30; 20 INJECTION, SOLUTION INTRAVENOUS at 15:00

## 2019-10-14 RX ADMIN — Medication 1 MILLI-UNITS/MIN: at 18:20

## 2019-10-14 RX ADMIN — SODIUM CHLORIDE, POTASSIUM CHLORIDE, SODIUM LACTATE AND CALCIUM CHLORIDE: 600; 310; 30; 20 INJECTION, SOLUTION INTRAVENOUS at 19:03

## 2019-10-14 RX ADMIN — SODIUM CHLORIDE, POTASSIUM CHLORIDE, SODIUM LACTATE AND CALCIUM CHLORIDE 500 ML: 600; 310; 30; 20 INJECTION, SOLUTION INTRAVENOUS at 14:30

## 2019-10-14 ASSESSMENT — MIFFLIN-ST. JEOR: SCORE: 1676.26

## 2019-10-14 NOTE — PROGRESS NOTES
"  S: Reports contractions are about the same as before and possibly less frequent than before. Discussed labor augmentation and recommended that we check her cervix.     O:  Blood pressure 132/81, temperature 98.4  F (36.9  C), temperature source Oral, resp. rate 16, height 1.727 m (5' 8\"), weight 85.3 kg (188 lb).  General appearance: mildly uncomfortable with contractions    CONTRACTIONS: Contractions every 2-4 minutes.  Palpate: mild  FETAL HEART TONES: baseline 145 with periods of minimal and periods of moderate FHR variability, no accelerations. Decelerations no.    NST:  NON-REACTIVE  CST: NEGATIVE  ROM: clear fluid  PELVIC EXAM:PELVIC EXAM: 3/ 25%/ Posterior/ average/ -2   Fetal Position:  cephalic  Bloody show: No  Pitocin- none,  Antibiotics- none  Cervical ripening: N/A  ASSESSMENT:  ==============  IUP @ 39w0d early labor, ROM for 14 hours  Fetal Heart rate tracing Category category two, with periods of category one  GBS- negative     PLAN:  ===========  comfort measures prn   Pain medication per patient request. Wants to avoid epidural. We discussed nitrous and fentanyl as well as a number of non pharmacologic comfort measures for labor.   Anticipate   Labor augmentation with Pitocin- Decided that this was safest r/t intermittent cat 2 FHTs and contraction pattern of q2-4.   reevaluate in 2-4 hours/PRN     Cece Rodriguez, HUSAM, APRN TIFFANIM, CNM    "

## 2019-10-14 NOTE — TELEPHONE ENCOUNTER
Pt 39 weeks, stating ROM last evening.  Melani every 10 minutes, getting more uncomfortable.  -GBS, +FM.  Going to L&D for eval.  [facility] and on-call CNM were alerted.  Elisabeth Washington RN

## 2019-10-14 NOTE — PROVIDER NOTIFICATION
EFM reviewed with MARY Rodriguez in department, overall moderate variability with occasional 13-20 minute periods of minimal variability, rare accelerations, no decelerations.  Melani every 2-4 minutes, pt has noted minimal progression in strength of UCs since arriving to unit.  Per CNm, okay to start pitocin and titrate carefully per protocol.  Oxytocin infusion started at 1 mu/min at 1820.

## 2019-10-14 NOTE — H&P
Deann Lund is a 18 year old female     No LMP recorded. Patient is pregnant., Estimated Date of Delivery: Oct 21, 2019 is calculated from lmp and verified with U/S     [facility] at 2:59 PM     ruptured with no labor.  Membranes are ruptured since 0300 and verified with sterile speculum exam by positive rom plus    HPI: Vannessa reports having a big gush of fluid about 0300.     PRENATAL COURSE  Prenatal care16 began at 16 wks gestation for a total of 9 prenatal visits.  Total wt gain 34  Prenatal vital signs WNL  Prenatal course was essentially uncomplicated    HISTORIES  Allergies   Allergen Reactions     Pineapple Swelling     Seasonal Allergies      Past Medical History:   Diagnosis Date     Asthma      Past Surgical History:   Procedure Laterality Date     HC TOOTH EXTRACTION W/FORCEP       Family History   Problem Relation Age of Onset     Asthma Mother      Hypertension Mother      Asthma Sister      Hypertension Sister      Breast Cancer Paternal Grandmother      Social History     Tobacco Use     Smoking status: Former Smoker     Smokeless tobacco: Never Used   Substance Use Topics     Alcohol use: Not Currently     OB History    Para Term  AB Living   1 0 0 0 0 0   SAB TAB Ectopic Multiple Live Births   0 0 0 0 0      # Outcome Date GA Lbr Huang/2nd Weight Sex Delivery Anes PTL Lv   1 Current                LABS:       Lab Results   Component Value Date    ABO PENDING 10/14/2019       Lab Results   Component Value Date    RH Pos 2019     Rhogam not indicated  Rubella immune   Treponema Pallidum Antibody  Negative    HIV    Non-reactive   Lab Results   Component Value Date    HGB 12.5 10/14/2019      Lab Results   Component Value Date    HEPBANG NONREACTIVE 2019     Lab Results   Component Value Date    GBS Negative 2019     other     ROS  Pt denies significant constitutional symptoms including fever and/or malaise.  Pt denies  "significant respiratory, cardiovacular, GI, or muscular/skeletal complaints.      PHYSICAL EXAM:  /81   Temp 97.9  F (36.6  C) (Oral)   Resp 16   Ht 1.727 m (5' 8\")   Wt 85.3 kg (188 lb)   BMI 28.59 kg/m    General appearance healthy, alert, active and no distress   Neuro:  denies headache and visual disturbances  Psych: Mentation normal and bright   Legs: 2+/2+, no clonus, no edema       Abdomen: gravid, single vertex fetus, non-tender, EFW 7.5 lbs. Pt is rashmi every 3-5 minutes, lasting 60 seconds and palpates mild    FETAL HEART TONES: baseline 135 with minimal FHRV variability and absent accelerations.  No decelerations present.     PELVIC EXAM: deferred  BLOODY SHOW:: no  Membranes as listed above    ASSESSMENT:  IUP @ 39 wks gestation  ruptured and in early labor  NST  non-reactive   Parity: Primip  GBS negative and membranes ruptured for 9 hours      PLAN:  Vannessa plans a low intervention birth. Would like to wait for labor to intensify, she feels like it is already getting stronger.   IV started for non-reative and at times, cat 2 FHt. Has periods of minimal variablity and periods of moderate variabilty. There are not accels nor decels.   Admit - see IP orders  Pain medication per patient request  Anticipate   Pt is on medicare -  Sallie Rodriguez CNM, APRN CNM       "

## 2019-10-14 NOTE — PROGRESS NOTES
"  S: Pt in bed and eating. Pausing during contractions. Reports they have increased in intensity since arrival. Friend, Mo, attentive at bedside    O:  Blood pressure 132/81, temperature 98.4  F (36.9  C), temperature source Oral, resp. rate 16, height 1.727 m (5' 8\"), weight 85.3 kg (188 lb).  General appearance: uncomfortable with contractions    CONTACTIONS: Contractions every 1.5-4 minutes.  Palpate: mild  FETAL HEART TONES: baseline 140 with moderate FHR variability and    accelerations no. Decelerations no.    NST: NON-REACTIVE    Electronic Fetal Monitoring:  O: Baseline rate normal  Variability minimal  Accelerations not present  Decelerations not present    Assessment: Category II EFM interpretation suggests absence of concern for fetal metabolic acidemia at this time due to periods of moderate variability.    Uterine Activity normal.    Interventions to improve fetal oxygenation for a category II or III tracing include: maternal positioning and IV fluid bolus .    Strip reviewed at [facility]    ROM: clear fluid  PELVIC EXAM:deferred  Fetal Position:  Cephalic confirmed by BSUS  Bloody show: No  Pitocin- none,  Antibiotics- none  Cervical ripening: N/A  ASSESSMENT:  ==============  IUP @ 39w0d early labor   GBS- negative  Cat II tracing with periods of moderate variability, no accels, and no decels.       PLAN:  ===========  -Reviewed qualifications for waterbirth and that cat II tracing does not meet these qualifications. Pt is at peace with this and really just has a strong desire to avoid epidural.  -Discussed options for comfort measures and pain relief. Open to other forms of pain relief/medication. Will notify us when desiring intervention  -Encouraged hydrotherapy, position changes, and walking  Anticipate   reevaluate in 1-2 hours/PRN     LEONEL Aragon    I was present with the medical student who participated in the service and in the documentation of the note. I have verified the history " and personally performed the physical exam and medical decision making. I agree with the assessment and plan of care as documented in the note.    Cece Rodriguez, TIFFANIM, APRN CNM CNM

## 2019-10-14 NOTE — PROGRESS NOTES
ADMIT NOTE  =================  39w0d  Deann Lund is a 18 year old female     with an No LMP recorded. Patient is pregnant. and Estimated Date of Delivery: Oct 21, 2019 [facility] at 1:37 PM in in early labor.   Fetal movement- active  ROM- yes, moderate clear   GBS- negative    HPI  ================  Reports large gush of fluid at 3am. Has been experiencing loss of fluid intermittently since. Contractions started around 4am. Reports they have been increasing in intensity and frequency.    FOB- is involved  Other labor support- Friend    PRENATAL COURSE  =================  Prenatal course was essentially uncomplicated     HISTORIES  ============  Allergies   Allergen Reactions     Pineapple Swelling     Seasonal Allergies      Past Medical History:   Diagnosis Date     Asthma      Past Surgical History:   Procedure Laterality Date     HC TOOTH EXTRACTION W/FORCEP     .  Family History   Problem Relation Age of Onset     Asthma Mother      Hypertension Mother      Asthma Sister      Hypertension Sister      Breast Cancer Paternal Grandmother      Social History     Tobacco Use     Smoking status: Former Smoker     Smokeless tobacco: Never Used   Substance Use Topics     Alcohol use: Not Currently     OB History    Para Term  AB Living   1 0 0 0 0 0   SAB TAB Ectopic Multiple Live Births   0 0 0 0 0      # Outcome Date GA Lbr Huang/2nd Weight Sex Delivery Anes PTL Lv   1 Current                 LABS:   ===========  Rhogam not indicated  Lab Results   Component Value Date    ABO B 2019       Lab Results   Component Value Date    RH Pos 2019     Lab Results   Component Value Date    RUBELLAABIGG POSTIVE 2019      No results found for: RPR  No results found for: HIV  Lab Results   Component Value Date    HGB 10.6 2019      Lab Results   Component Value Date    HEPBANG NONREACTIVE 2019     Lab Results   Component Value Date    GBS  "Negative 2019     ROS  =========  Pt denies significant respiratory, cardiovacular, GI, or muscular/skeletalcomplaints.    See RN data base ROS.       PHYSICAL EXAM:  ===============  Blood pressure 132/81, temperature 97.9  F (36.6  C), temperature source Oral, resp. rate 16, height 1.727 m (5' 8\"), weight 85.3 kg (188 lb).  General appearance: uncomfortable with contractions  Heart: RRR without murmur  Lungs: clear to auscultation   Neuro: denies headache and visual disturbances  Psych: Mentation normal and bright   Legs: 2+/2+, no clonus, no edema      Abdomen: gravid, single vertex fetus, non-tender between contractions.  EFW-  7 lbs.   CONTACTIONS: Contractions every 2-4minutes.  Palpate: moderate  FETAL HEART TONES: Initially absent variability without decels or accels for 15 minutes. Then baseline 135 with moderate FHR variability without accelerations. No decelerations present. IV bolus ordered.     PELVIC EXAM:deferred  BLOODY SHOW: no   ROM: Yes  FLUID: clear  AMNISURE: positive    ASSESSMENT:  ==============  IUP @ 39w0d in in early labor   GBS- negative  Cat II tracing    PLAN:  ===========  -Admit - see IP orders  -Anticipate    -Plan fluid bolus  -Continue to monitor cat II tracing  -Pt desires waterbirth and low-intervention  -Expectant management for PROM    LEONEL Aragon  "

## 2019-10-14 NOTE — PLAN OF CARE
Data: Patient presented to [facility] at 1230.   Reason for maternal/fetal assessment per patient is Rule out rupture of membranes  .  Patient is a . Prenatal record reviewed.      OB History    Para Term  AB Living   1 0 0 0 0 0   SAB TAB Ectopic Multiple Live Births   0 0 0 0 0      # Outcome Date GA Lbr Huang/2nd Weight Sex Delivery Anes PTL Lv   1 Current            . Medical history:   Past Medical History:   Diagnosis Date     Asthma    . Gestational Age 39w0d. VSS. Fetal movement present. Patient denies pelvic pressure, UTI symptoms, bloody show, vaginal bleeding, edema, headache, visual disturbances, epigastric or URQ pain. Support persons Moteace (friend) present.  Complains of leaking fluid since 0300, now rashmi painfully every few minutes.  Action: Verbal consent for EFM. Triage assessment completed. EFM applied. Uterine assessment per toco. Fetal assessment: Category 2 with minimal variability initially, discussed with MARY Rodriguez.  Pt repositioned, given ice water, IV access obtained.   Response: MARY Rodriguez CNM informed of patient arrival, membrane and labor status. Plan per provider is admit for labor. Patient verbalized agreement with plan. Patient transferred to room 471 ambulatory, oriented to room and call light.

## 2019-10-15 ENCOUNTER — ANESTHESIA EVENT (OUTPATIENT)
Dept: OBGYN | Facility: CLINIC | Age: 19
End: 2019-10-15
Payer: COMMERCIAL

## 2019-10-15 ENCOUNTER — ANESTHESIA (OUTPATIENT)
Dept: OBGYN | Facility: CLINIC | Age: 19
End: 2019-10-15
Payer: COMMERCIAL

## 2019-10-15 PROBLEM — Z98.891 S/P C-SECTION: Status: ACTIVE | Noted: 2019-10-15

## 2019-10-15 LAB — T PALLIDUM AB SER QL: NONREACTIVE

## 2019-10-15 PROCEDURE — 25000125 ZZHC RX 250: Performed by: NURSE ANESTHETIST, CERTIFIED REGISTERED

## 2019-10-15 PROCEDURE — 71000015 ZZH RECOVERY PHASE 1 LEVEL 2 EA ADDTL HR: Performed by: OBSTETRICS & GYNECOLOGY

## 2019-10-15 PROCEDURE — C9290 INJ, BUPIVACAINE LIPOSOME: HCPCS | Performed by: STUDENT IN AN ORGANIZED HEALTH CARE EDUCATION/TRAINING PROGRAM

## 2019-10-15 PROCEDURE — 40000977 ZZH STATISTIC ATTENDANCE AT DELIVERY

## 2019-10-15 PROCEDURE — 25000128 H RX IP 250 OP 636: Performed by: STUDENT IN AN ORGANIZED HEALTH CARE EDUCATION/TRAINING PROGRAM

## 2019-10-15 PROCEDURE — 25800030 ZZH RX IP 258 OP 636: Performed by: STUDENT IN AN ORGANIZED HEALTH CARE EDUCATION/TRAINING PROGRAM

## 2019-10-15 PROCEDURE — C1765 ADHESION BARRIER: HCPCS | Performed by: OBSTETRICS & GYNECOLOGY

## 2019-10-15 PROCEDURE — 25800030 ZZH RX IP 258 OP 636: Performed by: NURSE ANESTHETIST, CERTIFIED REGISTERED

## 2019-10-15 PROCEDURE — 12000001 ZZH R&B MED SURG/OB UMMC

## 2019-10-15 PROCEDURE — 40000170 ZZH STATISTIC PRE-PROCEDURE ASSESSMENT II: Performed by: OBSTETRICS & GYNECOLOGY

## 2019-10-15 PROCEDURE — 25000132 ZZH RX MED GY IP 250 OP 250 PS 637

## 2019-10-15 PROCEDURE — 25000132 ZZH RX MED GY IP 250 OP 250 PS 637: Performed by: STUDENT IN AN ORGANIZED HEALTH CARE EDUCATION/TRAINING PROGRAM

## 2019-10-15 PROCEDURE — 27110028 ZZH OR GENERAL SUPPLY NON-STERILE: Performed by: OBSTETRICS & GYNECOLOGY

## 2019-10-15 PROCEDURE — 25000128 H RX IP 250 OP 636: Performed by: NURSE ANESTHETIST, CERTIFIED REGISTERED

## 2019-10-15 PROCEDURE — 25000125 ZZHC RX 250: Performed by: STUDENT IN AN ORGANIZED HEALTH CARE EDUCATION/TRAINING PROGRAM

## 2019-10-15 PROCEDURE — 36000059 ZZH SURGERY LEVEL 3 EA 15 ADDTL MIN UMMC: Performed by: OBSTETRICS & GYNECOLOGY

## 2019-10-15 PROCEDURE — 3E0T3BZ INTRODUCTION OF ANESTHETIC AGENT INTO PERIPHERAL NERVES AND PLEXI, PERCUTANEOUS APPROACH: ICD-10-PCS | Performed by: ANESTHESIOLOGY

## 2019-10-15 PROCEDURE — 37000008 ZZH ANESTHESIA TECHNICAL FEE, 1ST 30 MIN: Performed by: OBSTETRICS & GYNECOLOGY

## 2019-10-15 PROCEDURE — 36000057 ZZH SURGERY LEVEL 3 1ST 30 MIN - UMMC: Performed by: OBSTETRICS & GYNECOLOGY

## 2019-10-15 PROCEDURE — 27210794 ZZH OR GENERAL SUPPLY STERILE: Performed by: OBSTETRICS & GYNECOLOGY

## 2019-10-15 PROCEDURE — 37000009 ZZH ANESTHESIA TECHNICAL FEE, EACH ADDTL 15 MIN: Performed by: OBSTETRICS & GYNECOLOGY

## 2019-10-15 PROCEDURE — 59515 CESAREAN DELIVERY: CPT | Mod: GC | Performed by: OBSTETRICS & GYNECOLOGY

## 2019-10-15 PROCEDURE — 71000014 ZZH RECOVERY PHASE 1 LEVEL 2 FIRST HR: Performed by: OBSTETRICS & GYNECOLOGY

## 2019-10-15 RX ORDER — AMOXICILLIN 250 MG
2 CAPSULE ORAL 2 TIMES DAILY PRN
Status: DISCONTINUED | OUTPATIENT
Start: 2019-10-15 | End: 2019-10-17 | Stop reason: HOSPADM

## 2019-10-15 RX ORDER — SIMETHICONE 80 MG
80 TABLET,CHEWABLE ORAL 4 TIMES DAILY PRN
Status: DISCONTINUED | OUTPATIENT
Start: 2019-10-15 | End: 2019-10-17 | Stop reason: HOSPADM

## 2019-10-15 RX ORDER — ONDANSETRON 4 MG/1
4 TABLET, ORALLY DISINTEGRATING ORAL EVERY 30 MIN PRN
Status: DISCONTINUED | OUTPATIENT
Start: 2019-10-15 | End: 2019-10-15 | Stop reason: HOSPADM

## 2019-10-15 RX ORDER — MEPERIDINE HYDROCHLORIDE 25 MG/ML
12.5 INJECTION INTRAMUSCULAR; INTRAVENOUS; SUBCUTANEOUS EVERY 5 MIN PRN
Status: DISCONTINUED | OUTPATIENT
Start: 2019-10-15 | End: 2019-10-15 | Stop reason: HOSPADM

## 2019-10-15 RX ORDER — IBUPROFEN 800 MG/1
800 TABLET, FILM COATED ORAL EVERY 6 HOURS PRN
Status: DISCONTINUED | OUTPATIENT
Start: 2019-10-15 | End: 2019-10-17 | Stop reason: HOSPADM

## 2019-10-15 RX ORDER — EPHEDRINE SULFATE 50 MG/ML
5 INJECTION, SOLUTION INTRAMUSCULAR; INTRAVENOUS; SUBCUTANEOUS
Status: DISCONTINUED | OUTPATIENT
Start: 2019-10-15 | End: 2019-10-15 | Stop reason: CLARIF

## 2019-10-15 RX ORDER — LABETALOL 20 MG/4 ML (5 MG/ML) INTRAVENOUS SYRINGE
10
Status: DISCONTINUED | OUTPATIENT
Start: 2019-10-15 | End: 2019-10-15 | Stop reason: HOSPADM

## 2019-10-15 RX ORDER — ACETAMINOPHEN 325 MG/1
975 TABLET ORAL EVERY 8 HOURS
Status: DISCONTINUED | OUTPATIENT
Start: 2019-10-15 | End: 2019-10-17 | Stop reason: HOSPADM

## 2019-10-15 RX ORDER — LANOLIN 100 %
OINTMENT (GRAM) TOPICAL
Status: DISCONTINUED | OUTPATIENT
Start: 2019-10-15 | End: 2019-10-17 | Stop reason: HOSPADM

## 2019-10-15 RX ORDER — FENTANYL CITRATE 50 UG/ML
10 INJECTION, SOLUTION INTRAMUSCULAR; INTRAVENOUS ONCE
Status: DISCONTINUED | OUTPATIENT
Start: 2019-10-15 | End: 2019-10-15 | Stop reason: CLARIF

## 2019-10-15 RX ORDER — DIMENHYDRINATE 50 MG/ML
25 INJECTION, SOLUTION INTRAMUSCULAR; INTRAVENOUS
Status: DISCONTINUED | OUTPATIENT
Start: 2019-10-15 | End: 2019-10-15 | Stop reason: HOSPADM

## 2019-10-15 RX ORDER — OXYTOCIN/0.9 % SODIUM CHLORIDE 30/500 ML
PLASTIC BAG, INJECTION (ML) INTRAVENOUS
Status: DISCONTINUED
Start: 2019-10-15 | End: 2019-10-15 | Stop reason: HOSPADM

## 2019-10-15 RX ORDER — NALOXONE HYDROCHLORIDE 0.4 MG/ML
.1-.4 INJECTION, SOLUTION INTRAMUSCULAR; INTRAVENOUS; SUBCUTANEOUS
Status: DISCONTINUED | OUTPATIENT
Start: 2019-10-15 | End: 2019-10-17 | Stop reason: HOSPADM

## 2019-10-15 RX ORDER — MORPHINE SULFATE 1 MG/ML
100 INJECTION, SOLUTION EPIDURAL; INTRATHECAL; INTRAVENOUS ONCE
Status: DISCONTINUED | OUTPATIENT
Start: 2019-10-15 | End: 2019-10-15 | Stop reason: CLARIF

## 2019-10-15 RX ORDER — METHYLERGONOVINE MALEATE 0.2 MG/ML
200 INJECTION INTRAVENOUS
Status: DISCONTINUED | OUTPATIENT
Start: 2019-10-15 | End: 2019-10-17 | Stop reason: HOSPADM

## 2019-10-15 RX ORDER — CITRIC ACID/SODIUM CITRATE 334-500MG
30 SOLUTION, ORAL ORAL
Status: COMPLETED | OUTPATIENT
Start: 2019-10-15 | End: 2019-10-15

## 2019-10-15 RX ORDER — NALBUPHINE HYDROCHLORIDE 10 MG/ML
2.5-5 INJECTION, SOLUTION INTRAMUSCULAR; INTRAVENOUS; SUBCUTANEOUS EVERY 6 HOURS PRN
Status: DISCONTINUED | OUTPATIENT
Start: 2019-10-15 | End: 2019-10-15 | Stop reason: CLARIF

## 2019-10-15 RX ORDER — AMOXICILLIN 250 MG
1 CAPSULE ORAL 2 TIMES DAILY PRN
Status: DISCONTINUED | OUTPATIENT
Start: 2019-10-15 | End: 2019-10-17 | Stop reason: HOSPADM

## 2019-10-15 RX ORDER — NALOXONE HYDROCHLORIDE 0.4 MG/ML
.1-.4 INJECTION, SOLUTION INTRAMUSCULAR; INTRAVENOUS; SUBCUTANEOUS
Status: DISCONTINUED | OUTPATIENT
Start: 2019-10-15 | End: 2019-10-15 | Stop reason: CLARIF

## 2019-10-15 RX ORDER — HYDROMORPHONE HYDROCHLORIDE 1 MG/ML
.3-.5 INJECTION, SOLUTION INTRAMUSCULAR; INTRAVENOUS; SUBCUTANEOUS EVERY 5 MIN PRN
Status: DISCONTINUED | OUTPATIENT
Start: 2019-10-15 | End: 2019-10-15 | Stop reason: HOSPADM

## 2019-10-15 RX ORDER — MISOPROSTOL 200 UG/1
400 TABLET ORAL
Status: DISCONTINUED | OUTPATIENT
Start: 2019-10-15 | End: 2019-10-17 | Stop reason: HOSPADM

## 2019-10-15 RX ORDER — SODIUM CHLORIDE, SODIUM LACTATE, POTASSIUM CHLORIDE, CALCIUM CHLORIDE 600; 310; 30; 20 MG/100ML; MG/100ML; MG/100ML; MG/100ML
INJECTION, SOLUTION INTRAVENOUS CONTINUOUS
Status: DISCONTINUED | OUTPATIENT
Start: 2019-10-15 | End: 2019-10-15 | Stop reason: HOSPADM

## 2019-10-15 RX ORDER — BUPIVACAINE HYDROCHLORIDE 7.5 MG/ML
1.6 INJECTION, SOLUTION EPIDURAL; RETROBULBAR ONCE
Status: DISCONTINUED | OUTPATIENT
Start: 2019-10-15 | End: 2019-10-15 | Stop reason: CLARIF

## 2019-10-15 RX ORDER — BUPIVACAINE HYDROCHLORIDE 2.5 MG/ML
INJECTION, SOLUTION EPIDURAL; INFILTRATION; INTRACAUDAL PRN
Status: DISCONTINUED | OUTPATIENT
Start: 2019-10-15 | End: 2019-10-15

## 2019-10-15 RX ORDER — LIDOCAINE 40 MG/G
CREAM TOPICAL
Status: DISCONTINUED | OUTPATIENT
Start: 2019-10-15 | End: 2019-10-15 | Stop reason: CLARIF

## 2019-10-15 RX ORDER — CARBOPROST TROMETHAMINE 250 UG/ML
250 INJECTION, SOLUTION INTRAMUSCULAR
Status: DISCONTINUED | OUTPATIENT
Start: 2019-10-15 | End: 2019-10-17 | Stop reason: HOSPADM

## 2019-10-15 RX ORDER — OXYTOCIN/0.9 % SODIUM CHLORIDE 30/500 ML
340 PLASTIC BAG, INJECTION (ML) INTRAVENOUS CONTINUOUS PRN
Status: DISCONTINUED | OUTPATIENT
Start: 2019-10-15 | End: 2019-10-17 | Stop reason: HOSPADM

## 2019-10-15 RX ORDER — CITRIC ACID/SODIUM CITRATE 334-500MG
30 SOLUTION, ORAL ORAL ONCE
Status: DISCONTINUED | OUTPATIENT
Start: 2019-10-15 | End: 2019-10-15 | Stop reason: CLARIF

## 2019-10-15 RX ORDER — ONDANSETRON 2 MG/ML
4 INJECTION INTRAMUSCULAR; INTRAVENOUS EVERY 30 MIN PRN
Status: DISCONTINUED | OUTPATIENT
Start: 2019-10-15 | End: 2019-10-15 | Stop reason: HOSPADM

## 2019-10-15 RX ORDER — ACETAMINOPHEN 325 MG/1
650 TABLET ORAL EVERY 4 HOURS PRN
Status: DISCONTINUED | OUTPATIENT
Start: 2019-10-18 | End: 2019-10-17 | Stop reason: HOSPADM

## 2019-10-15 RX ORDER — CEFAZOLIN SODIUM 2 G/100ML
2 INJECTION, SOLUTION INTRAVENOUS
Status: COMPLETED | OUTPATIENT
Start: 2019-10-15 | End: 2019-10-15

## 2019-10-15 RX ORDER — BISACODYL 10 MG
10 SUPPOSITORY, RECTAL RECTAL DAILY PRN
Status: DISCONTINUED | OUTPATIENT
Start: 2019-10-17 | End: 2019-10-17 | Stop reason: HOSPADM

## 2019-10-15 RX ORDER — HYDRALAZINE HYDROCHLORIDE 20 MG/ML
2.5-5 INJECTION INTRAMUSCULAR; INTRAVENOUS EVERY 10 MIN PRN
Status: DISCONTINUED | OUTPATIENT
Start: 2019-10-15 | End: 2019-10-15 | Stop reason: HOSPADM

## 2019-10-15 RX ORDER — HYDROCORTISONE 2.5 %
CREAM (GRAM) TOPICAL 3 TIMES DAILY PRN
Status: DISCONTINUED | OUTPATIENT
Start: 2019-10-15 | End: 2019-10-17 | Stop reason: HOSPADM

## 2019-10-15 RX ORDER — CEFAZOLIN SODIUM 1 G/3ML
1 INJECTION, POWDER, FOR SOLUTION INTRAMUSCULAR; INTRAVENOUS SEE ADMIN INSTRUCTIONS
Status: DISCONTINUED | OUTPATIENT
Start: 2019-10-15 | End: 2019-10-15 | Stop reason: HOSPADM

## 2019-10-15 RX ORDER — OXYCODONE HYDROCHLORIDE 5 MG/1
5-10 TABLET ORAL
Status: DISCONTINUED | OUTPATIENT
Start: 2019-10-15 | End: 2019-10-17 | Stop reason: HOSPADM

## 2019-10-15 RX ORDER — ONDANSETRON 2 MG/ML
4 INJECTION INTRAMUSCULAR; INTRAVENOUS EVERY 6 HOURS PRN
Status: DISCONTINUED | OUTPATIENT
Start: 2019-10-15 | End: 2019-10-17 | Stop reason: HOSPADM

## 2019-10-15 RX ORDER — KETOROLAC TROMETHAMINE 30 MG/ML
30 INJECTION, SOLUTION INTRAMUSCULAR; INTRAVENOUS EVERY 6 HOURS
Status: COMPLETED | OUTPATIENT
Start: 2019-10-15 | End: 2019-10-15

## 2019-10-15 RX ORDER — LIDOCAINE 40 MG/G
CREAM TOPICAL
Status: DISCONTINUED | OUTPATIENT
Start: 2019-10-15 | End: 2019-10-17 | Stop reason: HOSPADM

## 2019-10-15 RX ORDER — SODIUM CHLORIDE, SODIUM LACTATE, POTASSIUM CHLORIDE, CALCIUM CHLORIDE 600; 310; 30; 20 MG/100ML; MG/100ML; MG/100ML; MG/100ML
INJECTION, SOLUTION INTRAVENOUS CONTINUOUS PRN
Status: DISCONTINUED | OUTPATIENT
Start: 2019-10-15 | End: 2019-10-15

## 2019-10-15 RX ORDER — ONDANSETRON 2 MG/ML
INJECTION INTRAMUSCULAR; INTRAVENOUS PRN
Status: DISCONTINUED | OUTPATIENT
Start: 2019-10-15 | End: 2019-10-15

## 2019-10-15 RX ORDER — CITRIC ACID/SODIUM CITRATE 334-500MG
SOLUTION, ORAL ORAL
Status: COMPLETED
Start: 2019-10-15 | End: 2019-10-15

## 2019-10-15 RX ORDER — NALOXONE HYDROCHLORIDE 0.4 MG/ML
.1-.4 INJECTION, SOLUTION INTRAMUSCULAR; INTRAVENOUS; SUBCUTANEOUS
Status: ACTIVE | OUTPATIENT
Start: 2019-10-15 | End: 2019-10-16

## 2019-10-15 RX ORDER — ALBUTEROL SULFATE 90 UG/1
1-2 AEROSOL, METERED RESPIRATORY (INHALATION)
Status: DISCONTINUED | OUTPATIENT
Start: 2019-10-15 | End: 2019-10-17 | Stop reason: HOSPADM

## 2019-10-15 RX ORDER — OXYTOCIN/0.9 % SODIUM CHLORIDE 30/500 ML
PLASTIC BAG, INJECTION (ML) INTRAVENOUS CONTINUOUS PRN
Status: DISCONTINUED | OUTPATIENT
Start: 2019-10-15 | End: 2019-10-15

## 2019-10-15 RX ORDER — FENTANYL CITRATE 50 UG/ML
25-50 INJECTION, SOLUTION INTRAMUSCULAR; INTRAVENOUS
Status: DISCONTINUED | OUTPATIENT
Start: 2019-10-15 | End: 2019-10-15 | Stop reason: HOSPADM

## 2019-10-15 RX ORDER — PHENYLEPHRINE HCL IN 0.9% NACL 1 MG/10 ML
SYRINGE (ML) INTRAVENOUS CONTINUOUS PRN
Status: DISCONTINUED | OUTPATIENT
Start: 2019-10-15 | End: 2019-10-15

## 2019-10-15 RX ORDER — OXYTOCIN/0.9 % SODIUM CHLORIDE 30/500 ML
100 PLASTIC BAG, INJECTION (ML) INTRAVENOUS CONTINUOUS
Status: DISCONTINUED | OUTPATIENT
Start: 2019-10-15 | End: 2019-10-17 | Stop reason: HOSPADM

## 2019-10-15 RX ORDER — KETOROLAC TROMETHAMINE 30 MG/ML
30 INJECTION, SOLUTION INTRAMUSCULAR; INTRAVENOUS
Status: DISCONTINUED | OUTPATIENT
Start: 2019-10-15 | End: 2019-10-15 | Stop reason: HOSPADM

## 2019-10-15 RX ORDER — DEXTROSE, SODIUM CHLORIDE, SODIUM LACTATE, POTASSIUM CHLORIDE, AND CALCIUM CHLORIDE 5; .6; .31; .03; .02 G/100ML; G/100ML; G/100ML; G/100ML; G/100ML
INJECTION, SOLUTION INTRAVENOUS CONTINUOUS
Status: DISCONTINUED | OUTPATIENT
Start: 2019-10-15 | End: 2019-10-17 | Stop reason: HOSPADM

## 2019-10-15 RX ORDER — OXYTOCIN 10 [USP'U]/ML
10 INJECTION, SOLUTION INTRAMUSCULAR; INTRAVENOUS
Status: DISCONTINUED | OUTPATIENT
Start: 2019-10-15 | End: 2019-10-17 | Stop reason: HOSPADM

## 2019-10-15 RX ADMIN — SENNOSIDES AND DOCUSATE SODIUM 2 TABLET: 8.6; 5 TABLET ORAL at 08:31

## 2019-10-15 RX ADMIN — SODIUM CHLORIDE, SODIUM LACTATE, POTASSIUM CHLORIDE, CALCIUM CHLORIDE AND DEXTROSE MONOHYDRATE: 5; 600; 310; 30; 20 INJECTION, SOLUTION INTRAVENOUS at 09:37

## 2019-10-15 RX ADMIN — SENNOSIDES AND DOCUSATE SODIUM 2 TABLET: 8.6; 5 TABLET ORAL at 20:37

## 2019-10-15 RX ADMIN — SIMETHICONE CHEW TAB 80 MG 80 MG: 80 TABLET ORAL at 14:26

## 2019-10-15 RX ADMIN — Medication 100 ML/HR: at 09:38

## 2019-10-15 RX ADMIN — SIMETHICONE CHEW TAB 80 MG 80 MG: 80 TABLET ORAL at 19:06

## 2019-10-15 RX ADMIN — SODIUM CHLORIDE, POTASSIUM CHLORIDE, SODIUM LACTATE AND CALCIUM CHLORIDE: 600; 310; 30; 20 INJECTION, SOLUTION INTRAVENOUS at 02:03

## 2019-10-15 RX ADMIN — KETOROLAC TROMETHAMINE 30 MG: 30 INJECTION, SOLUTION INTRAMUSCULAR at 04:18

## 2019-10-15 RX ADMIN — KETOROLAC TROMETHAMINE 30 MG: 30 INJECTION, SOLUTION INTRAMUSCULAR at 10:09

## 2019-10-15 RX ADMIN — PHENYLEPHRINE HYDROCHLORIDE 100 MCG: 10 INJECTION INTRAVENOUS at 02:11

## 2019-10-15 RX ADMIN — KETOROLAC TROMETHAMINE 30 MG: 30 INJECTION, SOLUTION INTRAMUSCULAR at 21:46

## 2019-10-15 RX ADMIN — SODIUM CITRATE AND CITRIC ACID MONOHYDRATE 30 ML: 500; 334 SOLUTION ORAL at 01:47

## 2019-10-15 RX ADMIN — CEFAZOLIN SODIUM 2 G: 2 INJECTION, SOLUTION INTRAVENOUS at 02:12

## 2019-10-15 RX ADMIN — KETOROLAC TROMETHAMINE 30 MG: 30 INJECTION, SOLUTION INTRAMUSCULAR at 15:44

## 2019-10-15 RX ADMIN — Medication 50 MCG/MIN: at 02:11

## 2019-10-15 RX ADMIN — ACETAMINOPHEN 975 MG: 325 TABLET, FILM COATED ORAL at 14:27

## 2019-10-15 RX ADMIN — Medication 100 ML/HR: at 04:13

## 2019-10-15 RX ADMIN — ACETAMINOPHEN 975 MG: 325 TABLET, FILM COATED ORAL at 21:54

## 2019-10-15 RX ADMIN — BUPIVACAINE 20 ML: 13.3 INJECTION, SUSPENSION, LIPOSOMAL INFILTRATION at 03:55

## 2019-10-15 RX ADMIN — ONDANSETRON 4 MG: 2 INJECTION INTRAMUSCULAR; INTRAVENOUS at 02:31

## 2019-10-15 RX ADMIN — Medication 30 ML: at 01:47

## 2019-10-15 RX ADMIN — BUPIVACAINE HYDROCHLORIDE 20 ML: 2.5 INJECTION, SOLUTION EPIDURAL; INFILTRATION; INTRACAUDAL at 03:55

## 2019-10-15 RX ADMIN — SODIUM CHLORIDE, POTASSIUM CHLORIDE, SODIUM LACTATE AND CALCIUM CHLORIDE: 600; 310; 30; 20 INJECTION, SOLUTION INTRAVENOUS at 02:46

## 2019-10-15 RX ADMIN — AZITHROMYCIN MONOHYDRATE 500 MG: 500 INJECTION, POWDER, LYOPHILIZED, FOR SOLUTION INTRAVENOUS at 02:18

## 2019-10-15 RX ADMIN — OXYTOCIN-SODIUM CHLORIDE 0.9% IV SOLN 30 UNIT/500ML 600 ML/HR: 30-0.9/5 SOLUTION at 02:29

## 2019-10-15 RX ADMIN — ACETAMINOPHEN 975 MG: 325 TABLET, FILM COATED ORAL at 06:50

## 2019-10-15 NOTE — DISCHARGE SUMMARY
[facility] Discharge Summary    Deann Lund MRN# [id number]   Age: 18 year old YOB: 2000     Date of Admission:  10/14/2019  Date of Discharge::  10/17/2019   Admitting Physician:  DUKE Garcia CN  Discharge Physician:  Mervat Wilkerson MD             Admission Diagnoses:    pregnancy at 39w1d  Category II FHT  Prelabor rupture of membranes            Discharge Diagnosis:   Same, delivered   Category II FHT remote from delivery          Procedures:   Procedure(s): Primary low transverse  section with double layer closure via Pfannenstiel skin incision  Spinal anesthesia  TAP block                Medications Prior to Admission:     Medications Prior to Admission   Medication Sig Dispense Refill Last Dose     albuterol (PROVENTIL HFA) 108 (90 Base) MCG/ACT inhaler Inhale 1-2 puffs into the lungs   Past Week at Unknown time     Prenatal Vit-Fe Fumarate-FA (PRENATAL 19) CHEW Take 1 tablet by mouth   Past Week at Unknown time             Discharge Medications:        Review of your medicines      START taking      Dose / Directions   acetaminophen 325 MG tablet  Commonly known as:  TYLENOL      Dose:  325-650 mg  Take 1-2 tablets (325-650 mg) by mouth every 6 hours as needed for mild pain  Quantity:  30 tablet  Refills:  0     ibuprofen 600 MG tablet  Commonly known as:  ADVIL/MOTRIN      Dose:  600 mg  Take 1 tablet (600 mg) by mouth every 6 hours as needed for moderate pain  Quantity:  30 tablet  Refills:  0     oxyCODONE 5 MG tablet  Commonly known as:  ROXICODONE      Dose:  5 mg  Take 1 tablet (5 mg) by mouth every 6 hours as needed for pain (moderate to severe)  Quantity:  8 tablet  Refills:  0     SENNA-docusate sodium 8.6-50 MG tablet  Commonly known as:  SENNA S      Dose:  1 tablet  Take 1 tablet by mouth At Bedtime  Quantity:  90 tablet  Refills:  1        CONTINUE these medicines which have NOT CHANGED      Dose / Directions   PRENATAL 19 Chew      Dose:   1 tablet  Take 1 tablet by mouth  Refills:  0     PROVENTIL  (90 Base) MCG/ACT inhaler  Generic drug:  albuterol      Dose:  1-2 puff  Inhale 1-2 puffs into the lungs  Refills:  0           Where to get your medicines      These medications were sent to [id number] - [address]  [address] [facility] [address]    Phone:  [phone]     acetaminophen 325 MG tablet    ibuprofen 600 MG tablet    SENNA-docusate sodium 8.6-50 MG tablet     Some of these will need a paper prescription and others can be bought over the counter. Ask your nurse if you have questions.    Bring a paper prescription for each of these medications    oxyCODONE 5 MG tablet                 Consultations:   Anesthesia          Brief Admission History:   Ms. Deann Lund is a 18 year old now  who initially presented at 39w1d for rupture of membranes. She was admitted to the CNM service. She progressed to 6cm however had a persistent category 2 FHT and therefore  was recommended.       Intraoperative course   The procedure was uncomplicated.   mL.  See operative report for details.     Findings:   1. No abdominal wall or intraabdominal adhesions  2. Normal uterus, fallopian tubes, ovaries  3. Clear amniotic fluid  4. Liveborn female infant in cephalic presentation. APGARs 8 at 1 min and 9 at 5 min. Weight 7#        Postpartum Course   The patient's hospital course was unremarkable.  She recovered as anticipated and experienced no post-operative complications. On discharge, her pain was well controlled. Vaginal bleeding is similar to peak menstrual flow.  Voiding without difficulty.  Ambulating well and tolerating a normal diet.  No fever or significant wound drainage.  Breastfeeding well.  Infant is stable.  Had multiple BM on POD#2, likely due to stool softener. Discussed titrating to 1/d  She was discharged on post-partum day #2    She had  isolated mild range BPs in labor, her blood pressures were normal range in the postoperative period.    Post-partum hemoglobin:   Hemoglobin   Date Value Ref Range Status   10/16/2019 9.4 (L) 11.7 - 15.7 g/dL Final      She was discharged home with PO Fe       Discharge Instructions and Follow-Up:   Discharge diet: Regular   Discharge activity: No lifting greater than 20 lbs, pushing, pulling, or other strenuous activity for 6 weeks. Pelvic rest for 6 weeks including no sexual intercourse, tampons, or douching. No driving until you can slam on the breaks without pain or while on narcotic pain medications.    Discharge follow-up: Follow up with primary OB for routine postpartum visit in 6 weeks   Wound care: Keep incision clean and dry           Discharge Disposition:   Discharged to home      Yohana Plummer MD  Obstetrics and Gynecology, PGY-3  October 17, 2019 , 6:48 AM       Physician Attestation     I, Mervat Wilkerson MD, personally examined and evaluated this patient.  I discussed the patient with the resident/fellow and care team, and agree with the assessment and plan of care as documented in the note of 10/17/19.       I personally reviewed vital signs, medications, labs and exam.     Key findings: Doing well. Desires discharge home.   Discussed discharge precautions.   Discussed pain medication plan for discharge.   Discussed interpregnancy interval of at least a year for TOLAC.   Mervat Wilkerson MD  Date of Service (when I saw the patient): 10/17/19

## 2019-10-15 NOTE — PLAN OF CARE
Data: Vital signs within normal limits. Postpartum checks within normal limits - see flow record. Patient eating and drinking normally. Patient's prado was removed at 1:00 and is due to void. patient is up ambulating. No apparent signs of infection. Incision healing well. Patient performing self cares and is able to care for infant.  Action: Patient medicated during the shift for pain. See MAR. Patient reassessed within 1 hour after each medication and pain was improved - patient stated she was comfortable. Patient education done about breastfeeding, aki care. See flow record.  Response: Positive attachment behaviors observed with infant. Support persons mom and significant other present.   Plan: Anticipate discharge on postop day 2-3.

## 2019-10-15 NOTE — PROGRESS NOTES
"  S: Pt becoming more uncomfortable with contractions and reports increase in intensity.    O:  Blood pressure 130/82, temperature 98.6  F (37  C), temperature source Oral, resp. rate 16, height 1.727 m (5' 8\"), weight 85.3 kg (188 lb).  General appearance: uncomfortable with contractions.    CONTACTIONS: Contractions every 1.5-3 minutes.  Palpate: moderate  FETAL HEART TONES: baseline 140 with moderate and minimal FHR variability and    accelerations no. Decelerations no.    ROM: clear fluid  PELVIC EXAM:PELVIC EXAM: 6/ 100%/ Posterior/ soft/ -1   Fetal Position:  cephalic  Bloody show: Yes   Pitocin- 1 mu/min.,  Antibiotics- none  Cervical ripening: N/A  ASSESSMENT:  ==============  IUP @ 39w0d active labor   GBS- negative  Cat I  And periods of cat 2 tracing       PLAN:  ===========  -Attempted to use nitrous, but pt reports it was unhelpful. Not requesting any further pharmacological pain relief at this time  -Planning to try hydrotherapy  -Anticipate   -Pt rashmi too frequently with pitocin at 1mu/min. Pitocin discontinued at this time.  reevaluate in 2-4 hours/PRN     LEONEL Aragon  2019    I was present with the medical student who participated in the service and in the documentation of the note. I have verified the history and personally performed the physical exam and medical decision making. I agree with the assessment and plan of care as documented in the note.    Cece Rodriguez, HUSAM, APRN CNM CNM      "

## 2019-10-15 NOTE — PROGRESS NOTES
Provider notification:  Provider Name: Cece Rodriguez  HUSAM. Notified at 0030 regarding a persistent category II fetal heart rate tracing for 30 minutes.   Baseline rate tachycardia  Variability minimal  Accelerations not present  Decelerations present  Type of deceleration- prolonged, late  Deceleration frequency recurrent    EFM interpretation suggests concern for fetal metabolic acidemia at this time due to minimal variability and absence of accelerations    Uterine Activity normal/regular.    Interventions to improve fetal oxygenation for a category II tracing include:maternal positioning, IV fluid bolus , maternal oxygen mask, consult Category 2 algorithm and evaluate labor progress    After discussion with provider:Provider coming to bedside

## 2019-10-15 NOTE — PROVIDER NOTIFICATION
Electronic page sent:  Ms Lund in 471 had another episode 26 minutes of minimal variability, eventually resolved to moderate variability after position changes and oxygen.  Are you still planning on checking her?  Thanks,  Lindsay  [id number]

## 2019-10-15 NOTE — BRIEF OP NOTE
[facility]  Delivery  Brief Operative Note    Patient name: Deann Ludn  Patient MRN: [id number]  Patient : 2000    Surgery date: 10/15/2019    Surgeon: Brittney Dunn MD  Assistants: Lavinia Meza MD PGY2    Pre-operative diagnosis  1.  at 39w1d  2. Category II FHT remote from delivery  3. Asthma    Post-operative diagnosis  1. Same  2. Liveborn female infant    Procedure: Primary low-transverse  section with double layer uterine closure via Pfannensteil incision    Anesthesia: Spinal, TAP block    EBL: 1000 mL  IVF: 1300 mL    Drains: Gaston catheter    Specimens: Cord segment    Complications: None    Findings:   1. No abdominal wall or intraabdominal adhesions  2. Normal uterus, fallopian tubes, ovaries  3. Clear amniotic fluid  4. Liveborn female infant in cephalic presentation. APGARs 8 at 1 min and 9 at 5 min. Weight 7#     Lavinia Meza MD  Resident Physician, PGY2  Obstetrics, Gynecology, and Women's Health

## 2019-10-15 NOTE — PROGRESS NOTES
"  S:Feeling very comfortable with epidural. Was sleeping. Her mom and dad are supportive and at the bedside.     O:  Blood pressure 130/82, temperature 98.6  F (37  C), temperature source Oral, resp. rate 16, height 1.727 m (5' 8\"), weight 85.3 kg (188 lb).  General appearance: comfortable    CONTRACTIONS: Contractions every 2-4 minutes.  Palpate: moderate  FETAL HEART TONES: baseline 140 with moderate FHR variability and periods of minimal variabilty (ongoing pattern of 30 mins of each) and    accelerations no. Decelerations no.      ROM: clear fluid  PELVIC EXAM:PELVIC EXAM: 6/ 100%/ Posterior/ soft/ -1   Fetal Position:  cephalic  Bloody show: Yes   Pitocin- turned off for frequent contractions,  Antibiotics- none  Cervical ripening: N/A  ASSESSMENT:  ==============  IUP @ 39w0d active labor and good progress   Fetal Heart rate tracing Category category one with periods of cat 2  GBS- negative     PLAN:  ===========  comfort measures prn   Anticipate   reevaluate in 2-4 hours/PRN   Continue to monitor FHTs closely and apply fetal resusitation measures as needed.     Cece Rodriguez, TIFFANIM, APRN CNM, CNM    "

## 2019-10-15 NOTE — ANESTHESIA PROCEDURE NOTES
Peripheral Nerve Block Procedure Note    Staff:     Anesthesiologist:  Ish Alas DO    Resident/CRNA:  Radha Palacios MD    Block performed by resident/CRNA in the presence of a teaching physician    Location: PACU  Procedure Start/Stop TImes:      10/15/2019 3:52 AM    patient identified, IV checked, site marked, risks and benefits discussed, informed consent, monitors and equipment checked, pre-op evaluation, at physician/surgeon's request and post-op pain management      Correct Patient: Yes      Correct Position: Yes      Correct Site: Yes      Correct Procedure: Yes      Correct Laterality:  Yes    Site Marked:  Yes  Procedure details:     Procedure:  TAP    ASA:  2    Diagnosis:  S/p c section    Laterality:  Bilateral    Position:  Supine    Sterile Prep: chloraprep, mask and sterile gloves      Local skin infiltration:  None    Needle:  Short bevel    Needle gauge:  21    Needle length (mm):  110    Ultrasound: Yes      Ultrasound used to identify targeted nerve, plexus, or vascular structure and placed a needle adjacent to it      Permanent Image entered into patiient's record      Abnormal pain on injection: No      Blood Aspirated: No      Bolus via:  Needle    Infusion Method:  Single Shot    Complications:  None

## 2019-10-15 NOTE — PLAN OF CARE
Data: Deann Lund transferred to 7123 via cart at 0515. Baby transferred via parent's arms.  Action: Receiving unit notified of transfer: Yes. Patient and family notified of room change. Report given to Chelsea BUENO RN at 0545. Belongings sent to receiving unit. Accompanied by Registered Nurse. Oriented patient to surroundings. Call light within reach. ID bands double-checked with receiving RN.  Response: Patient tolerated transfer and is stable.

## 2019-10-15 NOTE — PROGRESS NOTES
"  S: Feeling like she is coping well with contractions. Asking for a cervix  Check. Family and friends in room providing support to patient.     O:  Blood pressure 124/77, temperature 98.5  F (36.9  C), temperature source Oral, resp. rate 16, height 1.727 m (5' 8\"), weight 85.3 kg (188 lb).  General appearance: uncomfortable with contractions    CONTRACTIONS: Contractions every 2-4 minutes.  Palpate: mild  FETAL HEART TONES: baseline 140 with moderate  And periods of minimal FHR variability and    accelerations no. Decelerations no.    ROM: clear fluid,  PELVIC EXAM:PELVIC EXAM: 4/ 70%/ Posterior/ average/ -2   Fetal Position:  Cephalic  Bloody show: Yes   Pitocin- 2 mu/min.,  Antibiotics- none  Cervical ripening: N/A  ASSESSMENT:  ==============  IUP @ 39w0d early labor, ROM    Fetal Heart rate tracing Category category one and cat two intermittently  GBS- negative     PLAN:  ===========  comfort measures prn   Anticipate   Labor augmentation with Pitocin  reevaluate in 2-4 hours/PRN     Cece Rodriguez CNM, APRN HUSAM, TIFFANIM    "

## 2019-10-15 NOTE — PROGRESS NOTES
"Paged by RN after prolonged decel. FHTs recovered to 170s with minimal variability. Baseline decreased to 150s with persistent minimal variability.     S: patient crying in the bed.     O:  Blood pressure 124/84, temperature 98.6  F (37  C), temperature source Oral, resp. rate 16, height 1.727 m (5' 8\"), weight 85.3 kg (188 lb).  General appearance: uncomfortable with contractions    CONTRACTIONS: Contractions every 1-2 minutes.  Palpate: moderate  FETAL HEART TONES: baseline 150 with minimal FHR variability and    accelerations no. Decelerations yes several late and then a prolonged decel at 0110. Decels resolved after patient went back to bed. FHTs persistent cat 2    ROM: clear fluid  PELVIC EXAM:PELVIC EXAM: 6/ 100%/ Posterior/ soft/ -1   Fetal Position:  cephalic  Bloody show: No  Pitocin- none,  Antibiotics- none  Cervical ripening: N/A  ASSESSMENT:  ==============  IUP @ 39w1d active labor and minimal/no progress   Fetal Heart rate tracing Category category one  GBS- negative     PLAN:  ===========  MD consultant on call Dr Dunn. / available prn  Prepare for  section     Cece Rodriguez, HUSAM, APRN HUSAM, CNM    "

## 2019-10-15 NOTE — PLAN OF CARE
Dr. Dunn has been here to see patient and has determined that Deann Lund should be readied for unscheduled  section due to category 2 FHR. Plan of care reviewed with patient and support person. Questions answered and concerns addressed by MD. Patient agrees with plan of care. Consent signed. Anesthesia notified. IV  fluids infusing well. Aurelio cloth applied to abdomen. Bicitra given. IV antibiotic initiated. CRNA has been notified. Ready for surgery. To OR per wheelchair.

## 2019-10-15 NOTE — PLAN OF CARE
AFVSS. Fundus firm with small flow. States pain controlled with tylenol and Toradol as ordered. Breastfeeding baby with minimal assistance. Gaston catheter draining clear, teddy urine. Offers no complaints/concerns. Settled for sleep.Continue postpartum cares.

## 2019-10-15 NOTE — PROGRESS NOTES
S: Pt resting comfortably in bed with boyfriend. FOB's mother also at bedside and attentive    O: uncomfortable with contractions    Since arrival, pt has gone between cat I and cat II tracings. She has had 15-30 minutes of absent/minimal variability in which interventions (I.e. fluid bolus, position changes) have been implemented followed by periods of moderate variability. Accels have been absent this labor course. One late decel was noted at ~2020.     Assessment: Category I EFM interpretation suggests absence of concern for fetal metabolic acidemia at this time due to periods of moderate moderate variability    Uterine Activity normal.    Strip reviewed at bedside     P:     -MD updated on FHR strip and labor course  -continue titration of pitocin per protocol  -Discussed and explained findings and concerns with patient and family. Informed that we will continue to closely monitor, but that a csection was a possibility down the road if baby has a persistent cat II tracing or decels despite interventions.  -Continue to monitor frequently    LEONEL Aragon  October 14, 2019    I was present with the medical student who participated in the service and in the documentation of the note. I have verified the history and personally performed the physical exam and medical decision making. I agree with the assessment and plan of care as documented in the note.    Cece Rodriguez, HUSAM, APRN TIFFANIM CNM

## 2019-10-15 NOTE — PLAN OF CARE
Pt gradually progressed into active labor over the course of the shift.  FHT intermittently Category 2 due to 20-30 minute periods of minimal variability.  Strip reviewed with CNM several times (see previous notes).  Oxytocin titrated per protocol.  Pt utilizing birthball, position changes for comfort.  Tried nitrous at 2230, didn't find it very helpful.  She is planning not to use fentanyl or epidural, she knows about her options and will ask if she changes her mind.  Bedside report given to [facility] RNs at 2315.  Plan to continue with current care plan.

## 2019-10-15 NOTE — ANESTHESIA PROCEDURE NOTES
Spinal/LP Procedure Note    Spinal Block  Staff:     Anesthesiologist:  Ish Alas DO  Location: OB  Procedure Start/Stop Times:     patient identified, IV checked, site marked, risks and benefits discussed, informed consent, monitors and equipment checked, pre-op evaluation, at physician/surgeon's request and post-op pain management      Correct Patient: Yes      Correct Position: Yes      Correct Site: Yes      Correct Procedure: Yes      Correct Laterality:  Yes    Site Marked:  Yes  Procedure:     Procedure:  Intrathecal    ASA:  2    Diagnosis:  Intrauterine pregnancy    Position:  Sitting    Sterile Prep: Betadine      Insertion site:  L3-4    Approach:  Midline    Needle Type:  Pencan    Needle gauge (G):  25    Local Skin Infiltration:  1% lidocaine    amount (ml):  3    Needle Length (in):  5    Introducer used: Yes      Introducer gauge:  20 G    Attempts:  3    Redirects:  2    CSF:  Clear    Paresthesias:  No  Assessment/Narrative:     Sensory Level:  T4     0.75% bupivicaine (1.6 ml) with 20 mcg of fentanyl and 0.1 mg duromorph given

## 2019-10-15 NOTE — OP NOTE
[facility]  Full Operative Progress Note     Surgery Date:  10/15/2019    Surgeon:  Brittney Dunn MD    Assistants:  Lavinia Meza MD PGY2    Pre-op Diagnosis:  1. Intrauterine pregnancy at 39w1d     2. Category II FHT remote from delivery     3. Asthma     Post-op Diagnosis:  1. Same      2. Liveborn female infant     Procedure:  Primary low-transverse  section with double layer uterine closure via Pfannenstiel skin incision    Anesthesia: Spinal, TAP block (post-operatively)    QBL:  786 cc    IVF:  1300 mL crystalloid    Drains: Gaston Catheter     Specimens:  Cord segment    Complications: None apparent    Indications:   Deann Lund is a 18 year old  at 39w1d admitted to the [facility] service with spontaneous rupture of membranes in early labor. She progressed to 6cm. FHT was category II for 1.5 hours with minimal variability and no accelerations. She had not made any cervical change. She was recommended to undergo  section for category II FHT remote from delivery.  The risks, benefits, and alternatives of  section were discussed with the patient, and she agreed to proceed.     Findings:   1. No abdominal wall or intraabdominal adhesions  2. Normal uterus, fallopian tubes, ovaries  3. Clear amniotic fluid  4. Liveborn female infant in cephalic presentation. APGARs 8 at 1 min and 9 at 5 min. Weight 7#     Procedure Details:   The patient was brought to the OR, where adequate spinal anesthesia was administered.  She was placed in the dorsal supine position with a slight leftward tilt. She was prepped and draped in the usual sterile fashion. A surgical time out was performed. A pfannenstiel skin incision was made with the scalpel, and carried down to the underlying fascia with sharp and blunt dissection. The fascia was incised in the midline, and the incision was extended laterally with the Alarcon scissors. The superior aspect of the fascia was grasped  with the Kocher clamps and dissected off of the underlying rectus muscles with blunt and sharp dissection. Attention was then turned to the inferior aspect of the fascia, which was similarly dissected off of the underlying rectus muscles. The rectus muscles were  in the midline, and the peritoneum was entered bluntly, and the opening was extended with digital pressure and with electrocautery. The bladder blade was placed. A transverse hysterotomy was made with the scalpel in the lower uterine segment, and the incision was extended with digital pressure. The infant was noted to be in the OA position, and was delivered atraumatically. The shoulders delivered easily.  No nuchal cord was noted. The cord was doubly clamped and cut, and the infant was handed off to the awaiting NICU staff. A segment of cord was cut and saved. The placenta was delivered with gentle traction on the umbilical cord and uterine massage. The uterus was exteriorized and cleared of all clots and debris. Uterine tone was noted to be adquate with pitocin given through the running IV and uterine massage.  The hysterotomy was closed with a running locked suture of 0 Monocryl.  The hysterotomy was then imbricated using an 0 Monocryl suture. The hysterotomy was noted to be hemostatic. The posterior cul-de-sac was irrigated and cleared of all clots and debris. The uterus was returned to the abdomen. The pericolic gutters were irrigated and cleared of all clots and debris. The hysterotomy was reexamined and noted to be hemostatic. Seprafilm was placed over the hysterotomy.  The fascia and rectus muscles were examined and areas of oozing were controlled with electrocautery. Seprafilm was placed over the rectus muscles. The fascia was closed with a running 0 Vicryl suture. The subcutaneous tissue was irrigated and areas of oozing were controlled with electrocautery. The subcutaneous tissue was less than 2 cm in thickness, and was therefore not  closed. The skin was closed with 4-0 Monocryl and covered surgical glue.    All sponge, needle, and instrument counts were correct. The patient tolerated the procedure well, and was transferred to recovery in stable condition. Dr. Dunn was present and scrubbed for the entirety of the procedure.     Lavinia Meza MD  Ob/Gyn PGY-2  October 15, 2019 3:45 AM      I was present and scrubbed for entirety of the surgical case and  agree with note as edited to reflect findings.      YAMILET DUNN MD

## 2019-10-15 NOTE — OR NURSING
OR to PACU Transfer Note  Data: Pt to OB PACU at 0314 via cart. PIV infusing without complications, prado with clear yellow urine to gravity, VSS, pt does not complain of pain and/or nausea.   Interventions: IV to pump, monitors and alarms on, SCD on.  Response: stable.  Plan: Patient instructed to notify RN for pain or nausea, routine post op cares, initiate breastfeeding/pumping as soon as patient/infant able.

## 2019-10-15 NOTE — PROGRESS NOTES
"   S:Pt in the bed with support from family and friends. Uncomfortable with contractions and coping very well.     O:  Blood pressure 131/86, temperature 98.6  F (37  C), temperature source Oral, resp. rate 16, height 1.727 m (5' 8\"), weight 85.3 kg (188 lb).  General appearance: uncomfortable with contractions    CONTRACTIONS: Contractions every 2-4 minutes.  Palpate: mild  FETAL HEART TONES: baseline 145 with periods of moderate FHR variability and also periods of minimal variability  accelerations Absent throughout current admission. Decelerations yes.  One variable deceleration.     ROM: clear fluid  PELVIC EXAM:deferred  Fetal Position:  cephalic  Bloody show: Yes   Pitocin- 3 mu/min.,  Antibiotics- none  Cervical ripening: N/A    ASSESSMENT:  ==============  IUP @ 39w0d early labor and augmentation r/t PROM  Fetal Heart rate tracing Category category one with 30 minute long periods of category 2 (minimal variability)  GBS- negative     PLAN:  ===========  Discussed periods of category 3 FHTs with patient. Discussed that we have given her fluid boluses and repositioned her to try to improve variablity. We discussed that should the FHTs become persistently category 2, we would recommend  delivery. The patient very much wants to avoid  delivery but understands our concern for fetal well being, the reasons for the interventions being used, and the possibility of a  delivery should the FHTs remain category 2 despite our interventions.   comfort measures prn   Anticipate   Labor augmentation with Pitocin  reevaluate in 2-4 hours/PRN     Cece Rodriguez CNM, APRN HUSAM, CNM    "

## 2019-10-15 NOTE — ANESTHESIA CARE TRANSFER NOTE
Patient: Deann Lund    Procedure(s):   SECTION    Diagnosis: * No pre-op diagnosis entered *  Diagnosis Additional Information: No value filed.    Anesthesia Type:   MAC, Spinal, Peripheral Nerve Block, For Post-op pain in coordination with surgeon     Note:  Airway :Room Air  Patient transferred to:PACU  Comments: VSS. Breathing spontaneously at a regular rate with adequate tidal volumes and maintaining O2 sats on room air. Denies nausea or pain. No apparent complications from anesthesia.     Radha Saeed MD  [facility] Anesthesia Resident  Handoff Report: Identifed the Patient, Identified the Reponsible Provider, Reviewed the pertinent medical history, Discussed the surgical course, Reviewed Intra-OP anesthesia mangement and issues during anesthesia, Set expectations for post-procedure period and Allowed opportunity for questions and acknowledgement of understanding      Vitals: (Last set prior to Anesthesia Care Transfer)    CRNA VITALS  10/15/2019 0240 - 10/15/2019 0318      10/15/2019             NIBP:  (!) 57/36    Pulse:  97    NIBP Mean:  42    SpO2:  100 %                Electronically Signed By: Radha Zapien MD  October 15, 2019  3:18 AM

## 2019-10-15 NOTE — PROVIDER NOTIFICATION
Reviewed EFM with MARY Rodriguez CNM in department.  Late decleration x1.  Plan to continue to monitor closely.

## 2019-10-15 NOTE — ANESTHESIA PREPROCEDURE EVALUATION
Anesthesia Pre-Procedure Evaluation    Patient: Deann Lund   MRN:     [id number] Gender:   female   Age:    18 year old :      2000        Preoperative Diagnosis: * No pre-op diagnosis entered *   Procedure(s):   SECTION     Past Medical History:   Diagnosis Date     Asthma       Past Surgical History:   Procedure Laterality Date     HC TOOTH EXTRACTION W/FORCEP            Anesthesia Evaluation     .             ROS/MED HX    ENT/Pulmonary:     (+)asthma , . .    Neurologic:  - neg neurologic ROS     Cardiovascular:  - neg cardiovascular ROS       METS/Exercise Tolerance:     Hematologic:  - neg hematologic  ROS       Musculoskeletal:  - neg musculoskeletal ROS       GI/Hepatic:  - neg GI/hepatic ROS       Renal/Genitourinary:  - ROS Renal section negative       Endo:  - neg endo ROS       Psychiatric:     (+) psychiatric history depression and other (comment) (histoyr of PTSD)      Infectious Disease:  - neg infectious disease ROS       Malignancy:      - no malignancy   Other:    (+) Possibly pregnant                        PHYSICAL EXAM:   Mental Status/Neuro: A/A/O   Airway: Facies: Feasible  Mallampati: I  Mouth/Opening: Full  TM distance: > 6 cm  Neck ROM: Full   Respiratory: Auscultation: CTAB     Resp. Rate: Normal     Resp. Effort: Normal      CV: Rhythm: Regular  Rate: Age appropriate  Heart: Normal Sounds  Edema: None   Comments:      Dental: Normal Dentition                LABS:  CBC:   Lab Results   Component Value Date    WBC 7.8 10/14/2019    HGB 12.5 10/14/2019    HGB 10.6 (L) 2019    HCT 36.6 10/14/2019    HCT 38.6 2019    HCT 38.6 2019     10/14/2019     2019     2019     2019     BMP: No results found for: NA, POTASSIUM, CHLORIDE, CO2, BUN, CR, GLC  COAGS: No results found for: PTT, INR, FIBR  POC: No results found for: BGM, HCG, HCGS  OTHER: No results found for: PH, LACT, A1C, LINDA, PHOS, MAG, ALBUMIN,  "PROTTOTAL, ALT, AST, GGT, ALKPHOS, BILITOTAL, BILIDIRECT, LIPASE, AMYLASE, LINN, TSH, T4, T3, CRP, SED     Preop Vitals    BP Readings from Last 3 Encounters:   10/14/19 124/84   10/10/19 125/75   10/04/19 136/74    Pulse Readings from Last 3 Encounters:   10/10/19 99   10/04/19 98   09/27/19 90      Resp Readings from Last 3 Encounters:   10/14/19 16    SpO2 Readings from Last 3 Encounters:   10/10/19 100%   10/04/19 100%   09/27/19 97%      Temp Readings from Last 1 Encounters:   10/14/19 37  C (98.6  F) (Oral)    Ht Readings from Last 1 Encounters:   10/14/19 1.727 m (5' 8\") (93 %)*     * Growth percentiles are based on CDC (Girls, 2-20 Years) data.      Wt Readings from Last 1 Encounters:   10/14/19 85.3 kg (188 lb) (96 %)*     * Growth percentiles are based on CDC (Girls, 2-20 Years) data.    Estimated body mass index is 28.59 kg/m  as calculated from the following:    Height as of this encounter: 1.727 m (5' 8\").    Weight as of this encounter: 85.3 kg (188 lb).     LDA:  Peripheral IV 10/14/19 Right Lower forearm (Active)   Site Assessment WDL 10/14/2019 11:35 PM   Line Status Infusing 10/14/2019 11:35 PM   Phlebitis Scale 0-->no symptoms 10/14/2019 11:35 PM   Infiltration Scale 0 10/14/2019 11:35 PM   Number of days: 1        Assessment:   ASA SCORE: 2    H&P: History and physical reviewed and following examination; no interval change.   Smoking Status:  Non-Smoker/Unknown   NPO Status: NPO Appropriate     Plan:   Anes. Type:  MAC; Spinal; Peripheral Nerve Block; For Post-op pain in coordination with surgeon     Block Details: TAP; Exparel; Single Shot   Pre-Medication: None   Induction:  N/a   Airway: Native Airway   Access/Monitoring: PIV   Maintenance: N/a     Postop Plan:   Postop Pain: Regional  Postop Sedation/Airway: Not planned  Disposition: Inpatient/Admit     PONV Management:   Adult Risk Factors: Female, Non-Smoker   Prevention: Ondansetron     CONSENT: Direct conversation   Plan and risks " discussed with: Patient   Blood Products: Consented (ALL Blood Products)                   Ish Alas DO

## 2019-10-15 NOTE — PROGRESS NOTES
"  S: Pt resting in bed with many attentive support people. Reports contractions feel the same.    O:  Blood pressure 124/77, temperature 98.5  F (36.9  C), temperature source Oral, resp. rate 16, height 1.727 m (5' 8\"), weight 85.3 kg (188 lb).  General appearance: uncomfortable with contractions    CONTACTIONS: Contractions every 2-4 minutes.  Palpate: moderate  FETAL HEART TONES: baseline 140 with moderate FHR variability and    accelerations no. Decelerations yes.    NST: REACTIVE  ROM: clear fluid  PELVIC EXAM:PELVIC EXAM: / Posterior/ average/ -2  Fetal Position:  cephalic  Bloody show: No  Pitocin- 2 mu/min.,  Antibiotics- none  Cervical ripening: N/A  ASSESSMENT:  ==============  IUP @ 39w0d early labor   GBS- negative    Electronic Fetal Monitoring:  O: Baseline rate normal  Variability moderate  Accelerations not present  Decelerations not present    Assessment: Category I EFM interpretation suggests absence of concern for fetal metabolic acidemia at this time due to moderate variability    Uterine Activity normal.      Strip reviewed at bedside       PLAN:  ===========  comfort measures prn; pt will notify if wanting pharmacological pain management  Anticipate   Labor augmentation with Pitocin  reevaluate in 2-4 hours/PRN     LEONEL Aragon    I was present with the medical student who participated in the service and in the documentation of the note. I have verified the history and personally performed the physical exam and medical decision making. I agree with the assessment and plan of care as documented in the note.    Cece Rodriguez, HUSAM, APRN CNM CNM    "

## 2019-10-15 NOTE — PROGRESS NOTES
Pt with cat 2 tracing now for 1.5 hours with minimal variability and no accels. Pt crying and took 15 min to calm down enough for consent.     c/s was discussed in detail including risk of bleeding, infection, damage to abdominal organs including bowel, bladder, blood vessels, nerves, and baby.   Recovery period/hosptial stay and restrictions discussed.  Pain medications after surgery were discussed.  All questions were answered.  Consent done.    Brittney Dunn MD

## 2019-10-15 NOTE — ANESTHESIA POSTPROCEDURE EVALUATION
Anesthesia POST Procedure Evaluation    Patient: Deann Lund   MRN:     [id number] Gender:   female   Age:    18 year old :      2000        Preoperative Diagnosis: * No pre-op diagnosis entered *   Procedure(s):   SECTION   Postop Comments: No value filed.       Anesthesia Type:  Not documented  MAC, Spinal, Peripheral Nerve Block, For Post-op pain in coordination with surgeon    Reportable Event: NO     PAIN: Uncomplicated   Sign Out status: Comfortable, Well controlled pain     PONV: No PONV   Sign Out status:  No Nausea or Vomiting     Neuro/Psych: Uneventful perioperative course   Sign Out Status: Preoperative baseline; Age appropriate mentation     Airway/Resp.: Uneventful perioperative course   Sign Out Status: Non labored breathing, age appropriate RR; Resp. Status within EXPECTED Parameters     CV: Uneventful perioperative course   Sign Out status: Appropriate BP and perfusion indices; Appropriate HR/Rhythm     Disposition:   Sign Out in:  PACU  Disposition:  Phase II; Home  Recovery Course: Uneventful  Follow-Up: Not required           Last Anesthesia Record Vitals:  CRNA VITALS  10/15/2019 0240 - 10/15/2019 0340      10/15/2019             NIBP:  133/85    Pulse:  97    NIBP Mean:  42    SpO2:  100 %          Last PACU Vitals:  Vitals Value Taken Time   /82 10/15/2019  4:00 AM   Temp 37  C (98.6  F) 10/15/2019  3:20 AM   Pulse 96 10/15/2019  4:00 AM   Resp 13 10/15/2019  4:03 AM   SpO2 98 % 10/15/2019  4:03 AM   Temp src     NIBP     Pulse     SpO2     Resp     Temp     Ht Rate     Temp 2     Vitals shown include unvalidated device data.      Electronically Signed By: Ish Alas DO, October 15, 2019, 4:03 AM

## 2019-10-15 NOTE — PROVIDER NOTIFICATION
H Michael to bedside for 30 minute period of minimal variability.  Pt repositioned to left lateral, LR bolus given.  Variability improved to moderate while provider at bedside.  Pt counseled about possibility of  if minimal variability were to persist.  Plan to continue to monitor closely.

## 2019-10-15 NOTE — PROVIDER NOTIFICATION
Reviewed uterine tachysytole with CNM. Plan made to decrease oxytocin to 1 mu/min, decreased at 2216.

## 2019-10-16 LAB — HGB BLD-MCNC: 9.4 G/DL (ref 11.7–15.7)

## 2019-10-16 PROCEDURE — 36415 COLL VENOUS BLD VENIPUNCTURE: CPT | Performed by: STUDENT IN AN ORGANIZED HEALTH CARE EDUCATION/TRAINING PROGRAM

## 2019-10-16 PROCEDURE — 25000132 ZZH RX MED GY IP 250 OP 250 PS 637: Performed by: STUDENT IN AN ORGANIZED HEALTH CARE EDUCATION/TRAINING PROGRAM

## 2019-10-16 PROCEDURE — 12000001 ZZH R&B MED SURG/OB UMMC

## 2019-10-16 PROCEDURE — 85018 HEMOGLOBIN: CPT | Performed by: STUDENT IN AN ORGANIZED HEALTH CARE EDUCATION/TRAINING PROGRAM

## 2019-10-16 RX ADMIN — SIMETHICONE CHEW TAB 80 MG 80 MG: 80 TABLET ORAL at 08:02

## 2019-10-16 RX ADMIN — SIMETHICONE CHEW TAB 80 MG 80 MG: 80 TABLET ORAL at 16:37

## 2019-10-16 RX ADMIN — SENNOSIDES AND DOCUSATE SODIUM 2 TABLET: 8.6; 5 TABLET ORAL at 08:02

## 2019-10-16 RX ADMIN — SENNOSIDES AND DOCUSATE SODIUM 2 TABLET: 8.6; 5 TABLET ORAL at 20:03

## 2019-10-16 RX ADMIN — IBUPROFEN 800 MG: 800 TABLET ORAL at 12:47

## 2019-10-16 RX ADMIN — IBUPROFEN 800 MG: 800 TABLET ORAL at 00:39

## 2019-10-16 RX ADMIN — OXYCODONE HYDROCHLORIDE 5 MG: 5 TABLET ORAL at 20:53

## 2019-10-16 RX ADMIN — IBUPROFEN 800 MG: 800 TABLET ORAL at 20:03

## 2019-10-16 RX ADMIN — ACETAMINOPHEN 975 MG: 325 TABLET, FILM COATED ORAL at 06:37

## 2019-10-16 RX ADMIN — SIMETHICONE CHEW TAB 80 MG 80 MG: 80 TABLET ORAL at 00:54

## 2019-10-16 RX ADMIN — IBUPROFEN 800 MG: 800 TABLET ORAL at 06:37

## 2019-10-16 RX ADMIN — ACETAMINOPHEN 975 MG: 325 TABLET, FILM COATED ORAL at 16:37

## 2019-10-16 NOTE — PLAN OF CARE
Data: Vital signs within normal limits. Postpartum checks within normal limits - see flow record. Patient eating and drinking normally. Patient able to empty bladder independently and is up ambulating. PIV saline locked. No apparent signs of infection. Incision healing well. Patient performing self cares and is able to care for infant. Breastfeeding baby on demand, latch checked. Patient also breast pumping for baby, getting around 5mL.   Action: Pain has been adequately managed with oral medication.   Response: Positive attachment behaviors observed with infant. Support person, aunt and cousin, present.   Plan: Continue with the plan of care.

## 2019-10-16 NOTE — PLAN OF CARE
Data: Vital signs within normal limits. Postpartum checks within normal limits - see flow record. Patient eating and drinking normally. Patient able to empty bladder independently and is up ambulating. No apparent signs of infection. Incision healing well. Patient performing self cares and is able to care for infant.  Action: Patient medicated during the shift for pain, cramping, and gas pain. See MAR. Patient stated she was comfortable.   Response: Positive attachment behaviors observed with infant. Support person present.   Plan: Continue with plan of care.

## 2019-10-16 NOTE — PLAN OF CARE
Data: Vital signs within normal limits. Postpartum checks within normal limits - see flow record. Patient eating and drinking normally. Patient able to empty bladder independently and is up ambulating. Patient performing self cares and is able to care for infant.  Action: Patient medicated during the shift for occasional incisional soreness.   Response: Positive attachment behaviors observed with infant.   Will continue to monitor and provide support.

## 2019-10-16 NOTE — LACTATION NOTE
This note was copied from a baby's chart.  Attempted to see x 2, but patient asleep both times.    Deann has been breastfeeding independently per bedside RN and her last latch score was 10.    Infant has age appropriate weight loss and output.

## 2019-10-17 VITALS
BODY MASS INDEX: 28.49 KG/M2 | OXYGEN SATURATION: 97 % | SYSTOLIC BLOOD PRESSURE: 116 MMHG | RESPIRATION RATE: 18 BRPM | TEMPERATURE: 97.8 F | HEIGHT: 68 IN | DIASTOLIC BLOOD PRESSURE: 67 MMHG | WEIGHT: 188 LBS | HEART RATE: 94 BPM

## 2019-10-17 PROCEDURE — 25000132 ZZH RX MED GY IP 250 OP 250 PS 637: Performed by: STUDENT IN AN ORGANIZED HEALTH CARE EDUCATION/TRAINING PROGRAM

## 2019-10-17 RX ORDER — OXYCODONE HYDROCHLORIDE 5 MG/1
5 TABLET ORAL EVERY 6 HOURS PRN
Qty: 8 TABLET | Refills: 0 | Status: SHIPPED | OUTPATIENT
Start: 2019-10-17 | End: 2020-01-14

## 2019-10-17 RX ORDER — IBUPROFEN 600 MG/1
600 TABLET, FILM COATED ORAL EVERY 6 HOURS PRN
Qty: 30 TABLET | Refills: 0 | Status: SHIPPED | OUTPATIENT
Start: 2019-10-17 | End: 2020-01-14

## 2019-10-17 RX ORDER — ACETAMINOPHEN 325 MG/1
325-650 TABLET ORAL EVERY 6 HOURS PRN
Qty: 30 TABLET | Refills: 0 | Status: SHIPPED | OUTPATIENT
Start: 2019-10-17 | End: 2020-01-14

## 2019-10-17 RX ORDER — SENNA AND DOCUSATE SODIUM 50; 8.6 MG/1; MG/1
1 TABLET, FILM COATED ORAL AT BEDTIME
Qty: 90 TABLET | Refills: 1 | Status: SHIPPED | OUTPATIENT
Start: 2019-10-17 | End: 2020-01-14

## 2019-10-17 RX ADMIN — OXYCODONE HYDROCHLORIDE 5 MG: 5 TABLET ORAL at 01:10

## 2019-10-17 RX ADMIN — IBUPROFEN 800 MG: 800 TABLET ORAL at 08:23

## 2019-10-17 RX ADMIN — SIMETHICONE CHEW TAB 80 MG 80 MG: 80 TABLET ORAL at 00:15

## 2019-10-17 RX ADMIN — SENNOSIDES AND DOCUSATE SODIUM 2 TABLET: 8.6; 5 TABLET ORAL at 08:23

## 2019-10-17 RX ADMIN — ACETAMINOPHEN 975 MG: 325 TABLET, FILM COATED ORAL at 08:23

## 2019-10-17 RX ADMIN — IBUPROFEN 800 MG: 800 TABLET ORAL at 02:33

## 2019-10-17 RX ADMIN — OXYCODONE HYDROCHLORIDE 5 MG: 5 TABLET ORAL at 06:53

## 2019-10-17 RX ADMIN — ACETAMINOPHEN 975 MG: 325 TABLET, FILM COATED ORAL at 00:15

## 2019-10-17 NOTE — PLAN OF CARE
Data: Vital signs within normal limits. Postpartum checks within normal limits - see flow record. Patient eating and drinking normally. Patient able to empty bladder independently and is up ambulating. Patient performing self cares and is able to care for infant.  Action: Patient medicated during the shift for incisional soreness. Well managed on ibuprofen, tylenol, and occasional oxycodone.  Response: Positive attachment behaviors observed with infant.  Will continue to monitor and provide support.

## 2019-10-17 NOTE — PROGRESS NOTES
Post Partum Progress Note  PPD#2    Subjective:   Vannessa is doing well this morning, sleeping well. No complaints this morning, still tolerating PO without issues. She had 5 BMs yesterday, no upset stomach, n/v or blood in stool. Lochia is minimal. Ambulating and voiding without issue.    Objective:  Patient Vitals for the past 24 hrs:   BP Temp Temp src Pulse Heart Rate Resp   10/17/19 0103 116/67 97.8  F (36.6  C) Oral -- 90 18   10/16/19 1641 130/76 98.1  F (36.7  C) Oral 94 -- 18   10/16/19 1000 122/85 98.2  F (36.8  C) Oral -- 88 16       General: NAD, resting comfortably  CV: Regular rate, well perfused.   Pulm: Normal respiratory effort.  Abd: Soft, non-tender, non-distended. Fundus is firm and 2 cm below the umbilicus.  Binder in place  Incision: pfannenstiel skin incision with overlying dermabond appears clean, dry, and intact, no erythema or edema  Ext: trace lower extremity edema bilaterally. No calf tenderness.    Assessment/Plan:  Deann Lund is a 18 year old  female who is POD#2 s/p PLTCS for Cat II remote from delivery. Procedure was uncomplicated. Patient doing well this morning.    - Encourage routine post-operative goals including ambulation and incentive spirometry  - PNC: Rh pos. Rubella immune. No intervention indicated.  - Pain: controlled on oral medications  - Heme: Hgb 12.5>>9.4 Plan to discharge home with iron.  - GI: continue anti-emetics and stool softeners as needed. Discussed holding stool softener with increased BM  - : S/p prado, voiding spontaneously.  - Infant: Stable in room  - Feeding: Plans on breastfeeding.  - BC: declines. Discussed recommended pregnancy spacing of at least 18 months    Discharge to home, tomorrow when as she is meeting post-op goals.    Yohana Plummer MD  Obstetrics and Gyncology, PGY-3  2019 , 6:46 AM       Physician Attestation   I, Mervat Wilkerson MD, personally examined and evaluated this patient.  I discussed  the patient with the resident/fellow and care team, and agree with the assessment and plan of care as documented in the note of 10/17/19.      I personally reviewed vital signs, medications, labs and exam.    Key findings: Doing well. Desires discharge home.   Discussed discharge precautions.   Discussed pain medication plan for discharge.   Discussed interpregnancy interval of at least a year for TOLAC.   Mervat Wilkerson MD  Date of Service (when I saw the patient): 10/17/19

## 2019-10-17 NOTE — PLAN OF CARE
Data: Vital signs within normal limits. Postpartum checks within normal limits - see flow record. Patient eating and drinking normally. Patient able to empty bladder independently and is up ambulating. No apparent signs of infection. Incision healing well. Patient performing self cares and is able to care for infant.  Action: Patient medicated with acetaminophen, ibuprofen, and oxycodone during the shift for pain. See MAR. Patient stated she was comfortable.   Response: Positive attachment behaviors observed with infant. Support person, maria antonia Mack. Birth certificate and ROP turned in.   Plan: Anticipate discharge on 10/17/19. Continue with plan of care.

## 2019-10-17 NOTE — PLAN OF CARE
Data: Vital signs within normal limits. Patient denies any headache, vision changes, shortness of breath, and/or epigastric pain. Postpartum checks within normal limits - see flow record. Patient eating and drinking normally. Patient able to empty bladder independently and is up ambulating. Patient reports she has had a bowel movement. No apparent signs of infection. Incision healing well. Patient performing self cares and is able to care for infant. Breastfeeding on demand, latch checked. Patient is breast pumping as well.   Action: Pain has been adequately managed with oral medications. Patient also wearing abdominal binder for additional comfort and support.   Response: Positive attachment behaviors observed with infant. Support person, aunt, is currently visiting with patient.   Plan: Continue with the plan of care.

## 2020-01-14 ENCOUNTER — OFFICE VISIT (OUTPATIENT)
Dept: MIDWIFE SERVICES | Facility: CLINIC | Age: 20
End: 2020-01-14
Payer: COMMERCIAL

## 2020-01-14 VITALS
SYSTOLIC BLOOD PRESSURE: 120 MMHG | DIASTOLIC BLOOD PRESSURE: 75 MMHG | BODY MASS INDEX: 26.76 KG/M2 | HEART RATE: 82 BPM | WEIGHT: 176 LBS

## 2020-01-14 DIAGNOSIS — N76.0 BACTERIAL VAGINOSIS: ICD-10-CM

## 2020-01-14 DIAGNOSIS — B96.89 BACTERIAL VAGINOSIS: ICD-10-CM

## 2020-01-14 DIAGNOSIS — Z30.011 ENCOUNTER FOR INITIAL PRESCRIPTION OF CONTRACEPTIVE PILLS: ICD-10-CM

## 2020-01-14 DIAGNOSIS — N89.8 VAGINAL DISCHARGE: Primary | ICD-10-CM

## 2020-01-14 DIAGNOSIS — Z11.3 SCREEN FOR STD (SEXUALLY TRANSMITTED DISEASE): ICD-10-CM

## 2020-01-14 DIAGNOSIS — Z32.00 PREGNANCY EXAMINATION OR TEST, PREGNANCY UNCONFIRMED: ICD-10-CM

## 2020-01-14 PROBLEM — Z34.01 PREGNANCY, FIRST, FIRST TRIMESTER: Status: RESOLVED | Noted: 2019-03-13 | Resolved: 2020-01-14

## 2020-01-14 LAB
HCG UR QL: NEGATIVE
SPECIMEN SOURCE: ABNORMAL
WET PREP SPEC: ABNORMAL

## 2020-01-14 PROCEDURE — 87389 HIV-1 AG W/HIV-1&-2 AB AG IA: CPT | Performed by: ADVANCED PRACTICE MIDWIFE

## 2020-01-14 PROCEDURE — 81025 URINE PREGNANCY TEST: CPT | Performed by: ADVANCED PRACTICE MIDWIFE

## 2020-01-14 PROCEDURE — 36415 COLL VENOUS BLD VENIPUNCTURE: CPT | Performed by: ADVANCED PRACTICE MIDWIFE

## 2020-01-14 PROCEDURE — 86696 HERPES SIMPLEX TYPE 2 TEST: CPT | Performed by: ADVANCED PRACTICE MIDWIFE

## 2020-01-14 PROCEDURE — 86803 HEPATITIS C AB TEST: CPT | Performed by: ADVANCED PRACTICE MIDWIFE

## 2020-01-14 PROCEDURE — 99213 OFFICE O/P EST LOW 20 MIN: CPT | Performed by: ADVANCED PRACTICE MIDWIFE

## 2020-01-14 PROCEDURE — 86695 HERPES SIMPLEX TYPE 1 TEST: CPT | Performed by: ADVANCED PRACTICE MIDWIFE

## 2020-01-14 PROCEDURE — 87591 N.GONORRHOEAE DNA AMP PROB: CPT | Performed by: ADVANCED PRACTICE MIDWIFE

## 2020-01-14 PROCEDURE — 87340 HEPATITIS B SURFACE AG IA: CPT | Performed by: ADVANCED PRACTICE MIDWIFE

## 2020-01-14 PROCEDURE — 87210 SMEAR WET MOUNT SALINE/INK: CPT | Performed by: ADVANCED PRACTICE MIDWIFE

## 2020-01-14 PROCEDURE — 87491 CHLMYD TRACH DNA AMP PROBE: CPT | Performed by: ADVANCED PRACTICE MIDWIFE

## 2020-01-14 PROCEDURE — 86780 TREPONEMA PALLIDUM: CPT | Performed by: ADVANCED PRACTICE MIDWIFE

## 2020-01-14 RX ORDER — METRONIDAZOLE 500 MG/1
500 TABLET ORAL 2 TIMES DAILY
Qty: 14 TABLET | Refills: 1 | Status: SHIPPED | OUTPATIENT
Start: 2020-01-14 | End: 2020-04-02

## 2020-01-14 RX ORDER — NORGESTIMATE AND ETHINYL ESTRADIOL 0.25-0.035
1 KIT ORAL DAILY
Qty: 84 TABLET | Refills: 3 | Status: SHIPPED | OUTPATIENT
Start: 2020-01-14 | End: 2020-07-15

## 2020-01-14 ASSESSMENT — PATIENT HEALTH QUESTIONNAIRE - PHQ9
5. POOR APPETITE OR OVEREATING: NOT AT ALL
SUM OF ALL RESPONSES TO PHQ QUESTIONS 1-9: 0

## 2020-01-14 ASSESSMENT — ANXIETY QUESTIONNAIRES
IF YOU CHECKED OFF ANY PROBLEMS ON THIS QUESTIONNAIRE, HOW DIFFICULT HAVE THESE PROBLEMS MADE IT FOR YOU TO DO YOUR WORK, TAKE CARE OF THINGS AT HOME, OR GET ALONG WITH OTHER PEOPLE: NOT DIFFICULT AT ALL
5. BEING SO RESTLESS THAT IT IS HARD TO SIT STILL: NOT AT ALL
3. WORRYING TOO MUCH ABOUT DIFFERENT THINGS: NOT AT ALL
2. NOT BEING ABLE TO STOP OR CONTROL WORRYING: NOT AT ALL
7. FEELING AFRAID AS IF SOMETHING AWFUL MIGHT HAPPEN: NOT AT ALL
GAD7 TOTAL SCORE: 0
6. BECOMING EASILY ANNOYED OR IRRITABLE: NOT AT ALL
1. FEELING NERVOUS, ANXIOUS, OR ON EDGE: NOT AT ALL

## 2020-01-14 NOTE — NURSING NOTE
"Chief Complaint   Patient presents with     Post Partum Exam     possible BC pills     STD     std testing     Pregnancy Test       Initial /75   Pulse 82   Wt 79.8 kg (176 lb)   BMI 26.76 kg/m   Estimated body mass index is 26.76 kg/m  as calculated from the following:    Height as of 10/14/19: 1.727 m (5' 8\").    Weight as of this encounter: 79.8 kg (176 lb).  BP completed using cuff size: regular    Questioned patient about current smoking habits.  Pt. Former smoker          The following HM Due: NONE      The following patient reported/Care Every where data was sent to:  P ABSTRACT QUALITY INITIATIVES [[id number]]        patient has appointment for today  Kayleigh Diane                "

## 2020-01-14 NOTE — PROGRESS NOTES
"metSUBJECTIVE: Deann Lund is a 19 year old  female presents with abnormal vaginal discharge   for 4 days. No LMP recorded.  LMP before Xmas  General medical, surgical, OB/Gyn and social histories   reviewed and updated in Histories section of [facility].       CC: patient is 4 months postpartum paris on 10/15/19 of viable female, states using condoms but interested in birth control pills, wants all STD testing and pregnancy test today.   States feels increased vaginal discharge.   for 4 weeks and then stopped has had 2 periods since delivery.   Usually at end of month. Concerned about STD  Pt concerned about \"bumps' on vaginal and wants herpes blood test.       Vaginal symptoms: discharge described as copious and local irritation.  Vulvar symptoms: none.  Discharge described as: normal and physiologic.  Other associated symptoms: none.  Menstrual pattern: She had been bleeding regularly.    OBJECTIVE:  Patient appears well, vital signs normal. Abdomen normal, soft   without tenderness, guarding, mass or organomegaly. No inguinal   adenopathy or CVA tenderness.  Urine pregnancy test negative  Wet prep: + clue cells       Pelvic Exam:External genitalia and vagina normal. Bimanual and rectovaginal normal., positive findings:  vaginal discharge - moderate, milky and frothy  Cultures obtained: GC and Chlamydia genprobes.  No bumps or lesions noted.      ASSESSMENT:   bacterial vaginosis.  (N89.8) Vaginal discharge  (primary encounter diagnosis)  Commen  Plan: Wet prep           (Z32.00) Pregnancy examination or test, pregnancy unconfirmed  Comment:  Plan: HCG Qual, Urine ([id number])        Negative urine hcg     (Z11.3) Screen for STD (sexually transmitted disease)    Plan: Chlamydia trachomatis PCR, Neisseria         gonorrhoeae PCR, HIV Antigen Antibody Combo,         Treponema Abs w Reflex to RPR and Titer,         Hepatitis B surface antigen, Hepatitis C         antibody, Herpes Simplex Virus 1 and 2 " IgG                PLAN:  Treatment plan per orders in [facility]. STD prevention discussed.   Abstain from intercourse for duration of treatment. Return if   symptoms do not resolve as anticipated.  Explained STD testing as well as herpes, discussed that many women are + for type 1 and we are looking for type 2.  Pt. Does want to start birth control pills and enc. To start the Sunday after next period as period is due next week. Strongly encouraged condom use for at least 2 weeks after birth control pills have started  Metronidazole 500 mg twice per day for 7 days sent to preferred pharmacy.  Patient has been instructed to abstain from ETOH and sexual intercourse during the course of this treatment.  rtc as needed  Cinthia Smalls CNM

## 2020-01-15 ASSESSMENT — ANXIETY QUESTIONNAIRES: GAD7 TOTAL SCORE: 0

## 2020-01-16 LAB
C TRACH DNA SPEC QL NAA+PROBE: NEGATIVE
HBV SURFACE AG SERPL QL IA: NONREACTIVE
HCV AB SERPL QL IA: NONREACTIVE
HIV 1+2 AB+HIV1 P24 AG SERPL QL IA: NONREACTIVE
N GONORRHOEA DNA SPEC QL NAA+PROBE: NEGATIVE
SPECIMEN SOURCE: NORMAL
SPECIMEN SOURCE: NORMAL

## 2020-01-17 LAB
HSV1 IGG SERPL QL IA: >8 AI (ref 0–0.8)
HSV2 IGG SERPL QL IA: <0.2 AI (ref 0–0.8)
T PALLIDUM AB SER QL: NONREACTIVE

## 2020-03-10 ENCOUNTER — HEALTH MAINTENANCE LETTER (OUTPATIENT)
Age: 20
End: 2020-03-10

## 2020-04-02 ENCOUNTER — VIRTUAL VISIT (OUTPATIENT)
Dept: MIDWIFE SERVICES | Facility: CLINIC | Age: 20
End: 2020-04-02
Payer: COMMERCIAL

## 2020-04-02 DIAGNOSIS — B96.89 BACTERIAL VAGINOSIS: ICD-10-CM

## 2020-04-02 DIAGNOSIS — N76.0 BACTERIAL VAGINOSIS: ICD-10-CM

## 2020-04-02 DIAGNOSIS — N89.8 VAGINAL DISCHARGE: Primary | ICD-10-CM

## 2020-04-02 PROCEDURE — 99441 ZZC PHYSICIAN TELEPHONE EVALUATION 5-10 MIN: CPT | Performed by: ADVANCED PRACTICE MIDWIFE

## 2020-04-02 RX ORDER — METRONIDAZOLE 500 MG/1
500 TABLET ORAL 2 TIMES DAILY
Qty: 14 TABLET | Refills: 0 | Status: SHIPPED | OUTPATIENT
Start: 2020-04-02 | End: 2020-04-09

## 2020-04-02 NOTE — PROGRESS NOTES
"Deann Lund is a 19 year old female who is being evaluated via a billable telephone visit.      The patient has been notified of following:     \"This telephone visit will be conducted via a call between you and your physician/provider. We have found that certain health care needs can be provided without the need for a physical exam.  This service lets us provide the care you need with a short phone conversation.  If a prescription is necessary we can send it directly to your pharmacy.  If lab work is needed we can place an order for that and you can then stop by our lab to have the test done at a later time.    If during the course of the call the physician/provider feels a telephone visit is not appropriate, you will not be charged for this service.\"     Patient has given verbal consent for Telephone visit?  Yes    Deann Lund complains of    Chief Complaint   Patient presents with     Consult       I have reviewed and updated the patient's Past Medical History, Social History, Family History and Medication List.    ALLERGIES  Pineapple and Seasonal allergies        SUBJECTIVE:                                                    eDann Lund is a 19 year old female who is being evaluated by phone visit today for the following health issues:  Concern - odor and vaginal discharge post menses    Onset: 5 day(s) ago     Description:   Location: Vag  Radiation:   Character: odor    Duration: (seconds, minutes, hours, days?):     Intensity: mild     Frequency (if intermittent):  infreq     Accompanying Signs & Symptoms and Therapies tried: None    Precipitating and/or Alleviating factors:  Hx of previous vaginitis: rare  Sexually active: yes, single partner, contraception - oral contraceptives  Contraception:  oral contraceptives.      Progression of Symptoms: (better, worse, same): worse    No LMP recorded.    Additional History: None Noted    Current Outpatient Medications:      metroNIDAZOLE (FLAGYL) " 500 MG tablet, Take 1 tablet (500 mg) by mouth 2 times daily for 7 days, Disp: 14 tablet, Rfl: 0     norgestimate-ethinyl estradiol (ORTHO-CYCLEN/SPRINTEC) 0.25-35 MG-MCG tablet, Take 1 tablet by mouth daily, Disp: 84 tablet, Rfl: 3     albuterol (PROVENTIL HFA) 108 (90 Base) MCG/ACT inhaler, Inhale 1-2 puffs into the lungs, Disp: , Rfl:     ROS:  5-Point Review of Systems Negative -- Except as noted above.    OBJECTIVE:                                                    There were no vitals taken for this visit. There is no height or weight on file to calculate BMI.   Appears in no acute distress based on phone call.    LAB:  Telephone Visit:  No labs today but review of past labs:  Wet prep:positive in past year x 2 for BV  GC/Chlamydia Screening:  Completed on 1/14/20 = Negative         ASSESSMENT/PLAN:                                                        ICD-10-CM    1. Patient Reported Vaginal discharge  N89.8    2. Presumed Bacterial vaginosis  N76.0 metroNIDAZOLE (FLAGYL) 500 MG tablet    B96.89     Based on pt symptoms     Current Outpatient Medications   Medication     metroNIDAZOLE (FLAGYL) 500 MG tablet     norgestimate-ethinyl estradiol (ORTHO-CYCLEN/SPRINTEC) 0.25-35 MG-MCG tablet     albuterol (PROVENTIL HFA) 108 (90 Base) MCG/ACT inhaler     No current facility-administered medications for this visit.      Discussed vaginitis, modes of transmission, and rationale for treatment.  Risks, benefits and alternatives of treatments discussed. Plan agreed on.    Time frame of call:  9 minutes    Mynor Sol CNM

## 2020-06-08 ENCOUNTER — VIRTUAL VISIT (OUTPATIENT)
Dept: MIDWIFE SERVICES | Facility: CLINIC | Age: 20
End: 2020-06-08
Payer: COMMERCIAL

## 2020-06-08 DIAGNOSIS — B96.89 BACTERIAL VAGINOSIS: Primary | ICD-10-CM

## 2020-06-08 DIAGNOSIS — B37.31 YEAST INFECTION OF THE VAGINA: ICD-10-CM

## 2020-06-08 DIAGNOSIS — N76.0 BACTERIAL VAGINOSIS: Primary | ICD-10-CM

## 2020-06-08 PROCEDURE — 99213 OFFICE O/P EST LOW 20 MIN: CPT | Mod: 95 | Performed by: ADVANCED PRACTICE MIDWIFE

## 2020-06-08 RX ORDER — METRONIDAZOLE 500 MG/1
500 TABLET ORAL 2 TIMES DAILY
Qty: 14 TABLET | Refills: 1 | Status: SHIPPED | OUTPATIENT
Start: 2020-06-08 | End: 2020-06-15

## 2020-06-08 RX ORDER — FLUCONAZOLE 150 MG/1
150 TABLET ORAL
Qty: 4 TABLET | Refills: 1 | Status: SHIPPED | OUTPATIENT
Start: 2020-06-08 | End: 2020-07-15

## 2020-06-08 NOTE — PROGRESS NOTES
"Deann Lund is a 19 year old female who is being evaluated via a billable telephone visit.      The patient has been notified of following:     \"This telephone visit will be conducted via a call between you and your physician/provider. We have found that certain health care needs can be provided without the need for a physical exam.  This service lets us provide the care you need with a short phone conversation.  If a prescription is necessary we can send it directly to your pharmacy.  If lab work is needed we can place an order for that and you can then stop by our lab to have the test done at a later time.    Telephone visits are billed at different rates depending on your insurance coverage. During this emergency period, for some insurers they may be billed the same as an in-person visit.  Please reach out to your insurance provider with any questions.    If during the course of the call the physician/provider feels a telephone visit is not appropriate, you will not be charged for this service.\"    Patient has given verbal consent for Telephone visit?  yes    What phone number would you like to be contacted at?[phone]    How would you like to obtain your AVS?n/a     Phone call duration: 15 minutes    Cinthia Smalls CNM          S; patient calling stating continues to have vaginal symptoms before period start then gets worse after periods starts.  LMP; 6/7/2020 so now does not have symptoms today.  Has hx of Bacterial vaginosis and yeast several times this year.   Has been on birth control pills for 6 months and is tired of taking every day wants to consider other forms of birth control she is interested in depoprovera, states she thought she used before but not sure.     Did not gain weight or have much break through bleeding.      A; presumptive bacterial vaginosis  Presumptive yeast infection.    P: will treat with both metronidazole and then diflucan.  Instructions given to patient to take " metronidazole first then start diflucan.  .Metronidazole 500 mg twice per day for 7 days sent to preferred pharmacy.  Patient has been instructed to abstain from ETOH and sexual intercourse during the course of this treatment.    Discussed briefly about birth control will send information about depoprovera and IUD   Discussed at length that she should take birth control pills that she has until she has another form of birth control in place.  Cinthia Smalls CNM

## 2020-07-15 ENCOUNTER — OFFICE VISIT (OUTPATIENT)
Dept: MIDWIFE SERVICES | Facility: CLINIC | Age: 20
End: 2020-07-15
Payer: COMMERCIAL

## 2020-07-15 VITALS
WEIGHT: 167 LBS | BODY MASS INDEX: 25.31 KG/M2 | HEIGHT: 68 IN | DIASTOLIC BLOOD PRESSURE: 78 MMHG | TEMPERATURE: 99.3 F | SYSTOLIC BLOOD PRESSURE: 121 MMHG | HEART RATE: 106 BPM

## 2020-07-15 DIAGNOSIS — Z11.3 SCREEN FOR STD (SEXUALLY TRANSMITTED DISEASE): ICD-10-CM

## 2020-07-15 DIAGNOSIS — B96.89 BV (BACTERIAL VAGINOSIS): Primary | ICD-10-CM

## 2020-07-15 DIAGNOSIS — N76.0 BV (BACTERIAL VAGINOSIS): Primary | ICD-10-CM

## 2020-07-15 LAB
SPECIMEN SOURCE: ABNORMAL
WET PREP SPEC: ABNORMAL

## 2020-07-15 PROCEDURE — 36415 COLL VENOUS BLD VENIPUNCTURE: CPT | Performed by: ADVANCED PRACTICE MIDWIFE

## 2020-07-15 PROCEDURE — 86803 HEPATITIS C AB TEST: CPT | Performed by: ADVANCED PRACTICE MIDWIFE

## 2020-07-15 PROCEDURE — 87591 N.GONORRHOEAE DNA AMP PROB: CPT | Performed by: ADVANCED PRACTICE MIDWIFE

## 2020-07-15 PROCEDURE — 87491 CHLMYD TRACH DNA AMP PROBE: CPT | Performed by: ADVANCED PRACTICE MIDWIFE

## 2020-07-15 PROCEDURE — 87210 SMEAR WET MOUNT SALINE/INK: CPT | Performed by: ADVANCED PRACTICE MIDWIFE

## 2020-07-15 PROCEDURE — 86780 TREPONEMA PALLIDUM: CPT | Performed by: ADVANCED PRACTICE MIDWIFE

## 2020-07-15 PROCEDURE — 99213 OFFICE O/P EST LOW 20 MIN: CPT | Performed by: ADVANCED PRACTICE MIDWIFE

## 2020-07-15 PROCEDURE — 87389 HIV-1 AG W/HIV-1&-2 AB AG IA: CPT | Performed by: ADVANCED PRACTICE MIDWIFE

## 2020-07-15 RX ORDER — METRONIDAZOLE 500 MG/1
500 TABLET ORAL 2 TIMES DAILY
Qty: 14 TABLET | Refills: 0 | Status: SHIPPED | OUTPATIENT
Start: 2020-07-15 | End: 2020-07-22

## 2020-07-15 ASSESSMENT — MIFFLIN-ST. JEOR: SCORE: 1576.01

## 2020-07-15 NOTE — PROGRESS NOTES
"S:  Deann Lund is a 19 year old  who presents today for STI testing. She has some mild discharge and odor but nothing really bothersome. She is mostly here today because she would like STI testing. She does not want to have sex for awhile so wants to check everything and then take a break from relationships. She has no known exposure. No symptoms. She is not on birth control. Does not want anything because she is not needing it at this time. Knows she can return anytime if she changes her mind. She has a nine month old daughter. She is not breastfeeding. 19 years old so not due for a pap smear. Discussed annual exams. No other questions or concerns today.     O:  /78 (BP Location: Left arm, Patient Position: Sitting, Cuff Size: Adult Regular)   Pulse 106   Temp 99.3  F (37.4  C) (Oral)   Ht 1.727 m (5' 8\")   Wt 75.8 kg (167 lb)   LMP 2020   Breastfeeding No   BMI 25.39 kg/m    Pelvic exam: normal vagina and vulva, vaginal discharge described as white.  Wet prep: clue cells   Chlamydia/Gonorrhea: pending at time of note.     A:  STI testing     P:  (N76.0,  B96.89) BV (bacterial vaginosis)  (primary encounter diagnosis)  Comment: Discussed with patient no alcohol use and no sex during treatment  Plan: metroNIDAZOLE (FLAGYL) 500 MG tablet    (Z11.3) Screen for STD (sexually transmitted disease)  Plan: Wet prep, Neisseria gonorrhoeae PCR, Chlamydia         trachomatis PCR, Hepatitis C antibody, HIV         Antigen Antibody Combo, Treponema Abs w Reflex         to RPR and Titer    Follow up as needed, if treatment does not improve odor.     DUKE Arriaga CNM   "

## 2020-07-16 LAB
C TRACH DNA SPEC QL NAA+PROBE: NEGATIVE
HCV AB SERPL QL IA: NONREACTIVE
HIV 1+2 AB+HIV1 P24 AG SERPL QL IA: NONREACTIVE
N GONORRHOEA DNA SPEC QL NAA+PROBE: NEGATIVE
SPECIMEN SOURCE: NORMAL
SPECIMEN SOURCE: NORMAL
T PALLIDUM AB SER QL: NONREACTIVE

## 2020-12-27 ENCOUNTER — HEALTH MAINTENANCE LETTER (OUTPATIENT)
Age: 20
End: 2020-12-27

## 2021-01-15 ENCOUNTER — HEALTH MAINTENANCE LETTER (OUTPATIENT)
Age: 21
End: 2021-01-15

## 2021-04-24 ENCOUNTER — HEALTH MAINTENANCE LETTER (OUTPATIENT)
Age: 21
End: 2021-04-24

## 2021-10-03 ENCOUNTER — HEALTH MAINTENANCE LETTER (OUTPATIENT)
Age: 21
End: 2021-10-03

## 2022-05-15 ENCOUNTER — HEALTH MAINTENANCE LETTER (OUTPATIENT)
Age: 22
End: 2022-05-15

## 2022-09-11 ENCOUNTER — HEALTH MAINTENANCE LETTER (OUTPATIENT)
Age: 22
End: 2022-09-11

## 2023-03-11 NOTE — PROGRESS NOTES
Post Partum Progress Note  PPD#1    Subjective:  Deann is doing well this morning. Pain is well-controlled, she is ambulating and voiding spontaneously. She did have a bowel movement this morning. Lochia is moderate, similar to a period. Plans to breast feed. Declines BC    Objective:  Patient Vitals for the past 24 hrs:   BP Temp Temp src Pulse Heart Rate Resp SpO2   10/16/19 0000 122/87 98.1  F (36.7  C) Oral -- 87 16 --   10/15/19 1551 126/76 98.2  F (36.8  C) Oral 87 -- 16 97 %   10/15/19 1430 123/76 97.8  F (36.6  C) Oral 89 -- 16 --   10/15/19 1010 130/70 97.4  F (36.3  C) Oral 78 -- -- 99 %   ]    General: NAD, resting comfortably  CV: Regular rate, well perfused.   Pulm: Normal respiratory effort.  Abd: Soft, non-tender, non-distended. Fundus is firm and 1 cm below the umbilicus.    Incision: pfannenstiel skin incision with overlying dermabond appears clean, dry, and intact  Ext: trace lower extremity edema bilaterally. No calf tenderness.    Assessment/Plan:  Deann Lund is a 18 year old  female who is POD#1 s/p PLTCS for Cat II remote from delivery. Procedure was uncomplicated. Patient doing well this morning.    - Encourage routine post-operative goals including ambulation and incentive spirometry  - PNC: Rh pos. Rubella immune. No intervention indicated.  - Pain: controlled on oral medications  - Heme: Hgb 12.5>>9.4 Plan to discharge home with iron.  - GI: continue anti-emetics and stool softeners as needed.  - : S/p prado, voiding spontaneously.  - Infant: Stable in room  - Feeding: Plans on breastfeeding.  - BC: declines. Discussed recommended pregnancy spacing of at least 18 months    Discharge to home, likely tomorrow when meeting post-op goals.    Yohana Plummer MD  Obstetrics and Gyncology, PGY-3  2019 , 6:36 AM     Physician Attestation   I, Gena Garrido MD, personally examined and evaluated this patient.  I discussed the patient with the  resident/fellow and care team, and agree with the assessment and plan of care as documented in the note of 10/16/19    I personally reviewed vital signs, medications and labs.    Key findings: Acute blood loss anemia.  Appropriate for EBL.  Plan to discharge to home on supplemental iron.    Gena Garrido MD  Date of Service (when I saw the patient): 10/16/19         Endocrinology

## 2023-06-03 ENCOUNTER — HEALTH MAINTENANCE LETTER (OUTPATIENT)
Age: 23
End: 2023-06-03

## 2025-06-26 ENCOUNTER — TELEPHONE (OUTPATIENT)
Dept: OBGYN | Facility: CLINIC | Age: 25
End: 2025-06-26
Payer: COMMERCIAL

## 2025-06-26 NOTE — TELEPHONE ENCOUNTER
RN called Deann     Not all of her records are pulling through via care everywhere so asked ptn to release us her records so we can see what's been happening. She was in [place] and also [place] for care.     LMP 4/22/25, went to [facility] ED on 5/31 after +UPT with no other symptoms. Ptn reports that she stated bleeding the week after that, for about a week the bleeding stopped. She has no complaints of bleeding or pain today. She took a UPT last week that had a very faint positive. Advised ptn to take a UPT 4 weeks after the miscarriage as it can take some time for it to reach negative. She will call or send us a message with the results. Connected her with  to make an appointment with us as she has not been seen in 5 years here.     Kinsey PATTEN RN   [facility] Triage RN

## 2025-06-26 NOTE — TELEPHONE ENCOUNTER
Health Call Center    Phone Message    May a detailed message be left on voicemail: yes     Reason for Call: Other: . Pt was seen at the ED for a miscarriage, she was seen in [facility], she was told to follow up with OBGYN for miscarriage mgmt, sending TE per protocol for miscarriage mgmt, please call pt back, thank you!    Action Taken: Message routed to:  Other: OBGYN    Travel Screening: Not Applicable     Date of Service:

## 2025-06-28 ENCOUNTER — HEALTH MAINTENANCE LETTER (OUTPATIENT)
Age: 25
End: 2025-06-28

## 2025-07-07 ENCOUNTER — OFFICE VISIT (OUTPATIENT)
Dept: OBGYN | Facility: CLINIC | Age: 25
End: 2025-07-07
Payer: COMMERCIAL

## 2025-07-07 VITALS
WEIGHT: 184.5 LBS | HEART RATE: 78 BPM | OXYGEN SATURATION: 100 % | BODY MASS INDEX: 28.05 KG/M2 | DIASTOLIC BLOOD PRESSURE: 85 MMHG | SYSTOLIC BLOOD PRESSURE: 127 MMHG

## 2025-07-07 DIAGNOSIS — R42 DIZZINESS: ICD-10-CM

## 2025-07-07 DIAGNOSIS — Z12.4 ENCOUNTER FOR PAPANICOLAOU SMEAR FOR CERVICAL CANCER SCREENING: ICD-10-CM

## 2025-07-07 DIAGNOSIS — N93.9 VAGINAL BLEEDING: Primary | ICD-10-CM

## 2025-07-07 DIAGNOSIS — J45.20 INTERMITTENT ASTHMA, UNSPECIFIED ASTHMA SEVERITY, UNSPECIFIED WHETHER COMPLICATED: ICD-10-CM

## 2025-07-07 LAB
HCG INTACT+B SERPL-ACNC: <1 MIU/ML
IRON BINDING CAPACITY (ROCHE): 340 UG/DL (ref 240–430)
IRON SATN MFR SERPL: 39 % (ref 15–46)
IRON SERPL-MCNC: 133 UG/DL (ref 37–145)
TSH SERPL DL<=0.005 MIU/L-ACNC: 0.97 UIU/ML (ref 0.3–4.2)

## 2025-07-07 PROCEDURE — 84702 CHORIONIC GONADOTROPIN TEST: CPT

## 2025-07-07 PROCEDURE — 83540 ASSAY OF IRON: CPT

## 2025-07-07 PROCEDURE — 99459 PELVIC EXAMINATION: CPT

## 2025-07-07 PROCEDURE — 3079F DIAST BP 80-89 MM HG: CPT

## 2025-07-07 PROCEDURE — 83550 IRON BINDING TEST: CPT

## 2025-07-07 PROCEDURE — G0145 SCR C/V CYTO,THINLAYER,RESCR: HCPCS

## 2025-07-07 PROCEDURE — 36415 COLL VENOUS BLD VENIPUNCTURE: CPT

## 2025-07-07 PROCEDURE — 99204 OFFICE O/P NEW MOD 45 MIN: CPT

## 2025-07-07 PROCEDURE — 84443 ASSAY THYROID STIM HORMONE: CPT

## 2025-07-07 PROCEDURE — 3074F SYST BP LT 130 MM HG: CPT

## 2025-07-07 RX ORDER — FLUTICASONE PROPIONATE 50 MCG
1 SPRAY, SUSPENSION (ML) NASAL DAILY
Qty: 18.2 ML | Refills: 4 | Status: SHIPPED | OUTPATIENT
Start: 2025-07-07

## 2025-07-07 RX ORDER — CETIRIZINE HYDROCHLORIDE 10 MG/1
10 TABLET ORAL DAILY
COMMUNITY

## 2025-07-07 RX ORDER — ALBUTEROL SULFATE 90 UG/1
1-2 INHALANT RESPIRATORY (INHALATION) EVERY 12 HOURS
Qty: 18 G | Refills: 3 | Status: SHIPPED | OUTPATIENT
Start: 2025-07-07

## 2025-07-07 RX ORDER — FLUTICASONE PROPIONATE 50 MCG
1 SPRAY, SUSPENSION (ML) NASAL DAILY
COMMUNITY
End: 2025-07-07

## 2025-07-07 NOTE — PROGRESS NOTES
CC: ED Follow-up, establish care  S: Deann is a 23 yo  here today for ED Follow-up and establish care  -LMP 25 DOTTIE   -[facility] ED  bleeding 1 wk suspected SAB  -took a home upt  and was concerned it was still + and made this apt  -subsequently  she had normal period  -recently relocated from [facility]  -not taking anything for pregnancy prevention  -other concerns- re-establish pcp, asthma action plan and feeling concerns with dizziness and high bp  -seen recently at [facility] for BV and high bp  -allergies exacerbated  -heart palpitations and near syncope with dizziness randomly    O:/85 (BP Location: Right arm, Patient Position: Sitting, Cuff Size: Adult Regular)   Pulse 78   Wt 83.7 kg (184 lb 8 oz)   LMP 2025   SpO2 100%   BMI 28.05 kg/m    Past Medical History:   Diagnosis Date    Arthritis     Asthma      Past Surgical History:   Procedure Laterality Date     SECTION N/A 10/15/2019    Procedure:  SECTION;  Surgeon: Brittney Dunn MD;  Location:  L+D     TOOTH EXTRACTION W/FORCEP       Current Outpatient Medications   Medication Sig Dispense Refill    albuterol (PROVENTIL HFA) 108 (90 Base) MCG/ACT inhaler Inhale 1-2 puffs into the lungs every 12 hours. 18 g 3    cetirizine (ZYRTEC) 10 MG tablet Take 10 mg by mouth daily.      fluticasone (FLONASE) 50 MCG/ACT nasal spray Spray 1 spray into both nostrils daily. 18.2 mL 4     No current facility-administered medications for this visit.        Allergies   Allergen Reactions    Pineapple Swelling    Seasonal Allergies      Alert pleasant NAD  RRR  Normal bp and pulse  Pelvic Exam:  Vulva: No external lesions, normal hair distribution, no adenopathy  Vagina: Moist, pink, no abnormal discharge, well rugated, no lesions  Cervix: Pap smear is taken, parous, smooth, pink, no visible lesions  Uterus: Normal size, anteverted, non-tender, mobile  Ovaries: No mass, non-tender, mobile  Rectal  exam: external appearance normal    Component      Latest Ref Rng 2025  1:42 PM   Iron      37 - 145 ug/dL 133    Iron Binding Capacity      240 - 430 ug/dL 340    Iron Sat Index      15 - 46 % 39    hCG Quantitative      <5 mIU/mL <1    TSH      0.30 - 4.20 uIU/mL 0.97          A/P:  Deann is a 23 yo  here today for ED Follow-up and establish care  1. Vaginal bleeding (Primary)  -hcg quant negative normal periods have resumed  -declines pregnancy prevention  -moved back from [facility] trying to stay here for a while and settle down  -no concerns   -start PNV prior to conception   - HCG quantitative pregnancy; Future  - HCG quantitative pregnancy    2. Encounter for Papanicolaou smear for cervical cancer screening  Routine screening  - Pap Screen Reflex to HPV if ASCUS - Recommended Age 25 - 29 Years    3. Dizziness  -reviewed checking bp at home when feeling sx- and keep log bring with to PCP  -recc workup with pcp  -hydration- iron studies wnl  -normal periods  -standing labs to collect for further examination- however pt had blood drawn already and declined second draw  - TSH with free T4 reflex; Future  - CBC with platelets; Future  - Iron and iron binding capacity; Future  - Ferritin; Future  - Hemoglobin A1c; Future  - Home Blood Pressure Monitor Order for DME - ONLY FOR DME  - Primary Care Referral; Future  - Iron and iron binding capacity  - TSH with free T4 reflex    4. Intermittent asthma, unspecified asthma severity, unspecified whether complicated  -see asthma for asthma plan  - albuterol (PROVENTIL HFA) 108 (90 Base) MCG/ACT inhaler; Inhale 1-2 puffs into the lungs every 12 hours.  Dispense: 18 g; Refill: 3  - fluticasone (FLONASE) 50 MCG/ACT nasal spray; Spray 1 spray into both nostrils daily.  Dispense: 18.2 mL; Refill: 4  - Adult Allergy/Asthma  Referral; Future  - Primary Care Referral; Future    Rtc annually or PRN with gyn concerns  DUKE Winn CNP

## 2025-07-10 LAB
BKR LAB AP GYN ADEQUACY: NORMAL
BKR LAB AP GYN INTERPRETATION: NORMAL
BKR LAB AP HPV REFLEX: NORMAL
BKR LAB AP PREVIOUS ABNORMAL: NORMAL
PATH REPORT.COMMENTS IMP SPEC: NORMAL
PATH REPORT.COMMENTS IMP SPEC: NORMAL
PATH REPORT.RELEVANT HX SPEC: NORMAL

## (undated) DEVICE — ESU GROUND PAD UNIVERSAL W/O CORD

## (undated) DEVICE — SUCTION CANISTER MEDIVAC LINER 1500ML W/LID 65651-515

## (undated) DEVICE — STOCKING SLEEVE COMPRESSION CALF LG

## (undated) DEVICE — SOL WATER IRRIG 1000ML BOTTLE 07139-09

## (undated) DEVICE — STRAP KNEE/BODY 31143004

## (undated) DEVICE — BASIN SET MAJOR

## (undated) DEVICE — GLOVE PROTEXIS BLUE W/NEU-THERA 6.5  2D73EB65

## (undated) DEVICE — SU MONOCRYL 0 CTB-1 36" YB946

## (undated) DEVICE — GLOVE ESTEEM POWDER FREE SMT 6.0  2D72PT60

## (undated) DEVICE — ADH SKIN CLOSURE PREMIERPRO EXOFIN 1.0ML 3470

## (undated) DEVICE — BARRIER SEPRAFILM 5X6" SINGLE SHEET 4301-02

## (undated) DEVICE — PACK C-SECTION LF PL15OTA83B

## (undated) DEVICE — SU VICRYL 0 CT-1 36" J346H

## (undated) DEVICE — PREP CHLORAPREP 26ML TINTED ORANGE  260815

## (undated) DEVICE — SOL NACL 0.9% IRRIG 1000ML BOTTLE 07138-09

## (undated) DEVICE — CATH TRAY FOLEY 16FR BARDEX W/DRAIN BAG STATLOCK 300316A

## (undated) RX ORDER — FENTANYL CITRATE 50 UG/ML
INJECTION, SOLUTION INTRAMUSCULAR; INTRAVENOUS
Status: DISPENSED
Start: 2019-10-15

## (undated) RX ORDER — MORPHINE SULFATE 1 MG/ML
INJECTION, SOLUTION EPIDURAL; INTRATHECAL; INTRAVENOUS
Status: DISPENSED
Start: 2019-10-15

## (undated) RX ORDER — PHENYLEPHRINE HCL IN 0.9% NACL 1 MG/10 ML
SYRINGE (ML) INTRAVENOUS
Status: DISPENSED
Start: 2019-10-15

## (undated) RX ORDER — KETOROLAC TROMETHAMINE 30 MG/ML
INJECTION, SOLUTION INTRAMUSCULAR; INTRAVENOUS
Status: DISPENSED
Start: 2019-10-15

## (undated) RX ORDER — OXYTOCIN/0.9 % SODIUM CHLORIDE 30/500 ML
PLASTIC BAG, INJECTION (ML) INTRAVENOUS
Status: DISPENSED
Start: 2019-10-15